# Patient Record
Sex: MALE | Race: WHITE | NOT HISPANIC OR LATINO | URBAN - METROPOLITAN AREA
[De-identification: names, ages, dates, MRNs, and addresses within clinical notes are randomized per-mention and may not be internally consistent; named-entity substitution may affect disease eponyms.]

---

## 2023-02-14 PROBLEM — I25.10 CORONARY ARTERY DISEASE: Status: ACTIVE | Noted: 2023-02-14

## 2023-02-14 PROBLEM — E55.9 VITAMIN D DEFICIENCY: Status: ACTIVE | Noted: 2023-02-14

## 2023-02-14 PROBLEM — E78.5 HYPERLIPIDEMIA: Status: ACTIVE | Noted: 2023-02-14

## 2023-02-14 PROBLEM — G47.30 SLEEP APNEA: Status: ACTIVE | Noted: 2023-02-14

## 2023-02-14 PROBLEM — I22.2: Status: ACTIVE | Noted: 2023-02-14

## 2023-02-14 PROBLEM — E66.01 MORBID OBESITY WITH BMI OF 40.0-44.9, ADULT (MULTI): Status: ACTIVE | Noted: 2023-02-14

## 2023-02-14 PROBLEM — E11.9 DIABETES MELLITUS (MULTI): Status: ACTIVE | Noted: 2023-02-14

## 2023-02-14 PROBLEM — K58.9 IBS (IRRITABLE BOWEL SYNDROME): Status: ACTIVE | Noted: 2023-02-14

## 2023-02-14 PROBLEM — I10 BENIGN ESSENTIAL HYPERTENSION: Status: ACTIVE | Noted: 2023-02-14

## 2023-02-14 PROBLEM — H90.3 SENSORINEURAL HEARING LOSS (SNHL) OF BOTH EARS: Status: ACTIVE | Noted: 2023-02-14

## 2023-02-14 RX ORDER — ATORVASTATIN CALCIUM 80 MG/1
80 TABLET, FILM COATED ORAL NIGHTLY
COMMUNITY
End: 2024-02-20 | Stop reason: WASHOUT

## 2023-02-14 RX ORDER — CARVEDILOL 6.25 MG/1
TABLET ORAL 2 TIMES DAILY
COMMUNITY
End: 2024-02-20 | Stop reason: WASHOUT

## 2023-02-14 RX ORDER — METFORMIN HYDROCHLORIDE 500 MG/1
1 TABLET ORAL DAILY
COMMUNITY
Start: 2014-09-15 | End: 2024-02-20 | Stop reason: WASHOUT

## 2023-02-14 RX ORDER — DAPAGLIFLOZIN 10 MG/1
1 TABLET, FILM COATED ORAL DAILY
COMMUNITY
End: 2024-02-20 | Stop reason: WASHOUT

## 2023-02-14 RX ORDER — EZETIMIBE 10 MG/1
10 TABLET ORAL DAILY
COMMUNITY
End: 2024-02-20 | Stop reason: WASHOUT

## 2023-02-14 RX ORDER — OLMESARTAN MEDOXOMIL 20 MG/1
20 TABLET ORAL DAILY
COMMUNITY
End: 2024-02-20 | Stop reason: WASHOUT

## 2023-02-14 RX ORDER — ASPIRIN 81 MG/1
81 TABLET ORAL DAILY
COMMUNITY

## 2023-03-27 ENCOUNTER — APPOINTMENT (OUTPATIENT)
Dept: PRIMARY CARE | Facility: CLINIC | Age: 63
End: 2023-03-27
Payer: COMMERCIAL

## 2023-04-15 LAB
ALANINE AMINOTRANSFERASE (SGPT) (U/L) IN SER/PLAS: 23 U/L (ref 10–52)
ALBUMIN (G/DL) IN SER/PLAS: 4 G/DL (ref 3.4–5)
ALBUMIN (MG/L) IN URINE: <7 MG/L
ALBUMIN/CREATININE (UG/MG) IN URINE: NORMAL UG/MG CRT (ref 0–30)
ALKALINE PHOSPHATASE (U/L) IN SER/PLAS: 75 U/L (ref 33–136)
ANION GAP IN SER/PLAS: 13 MMOL/L (ref 10–20)
ASPARTATE AMINOTRANSFERASE (SGOT) (U/L) IN SER/PLAS: 17 U/L (ref 9–39)
BILIRUBIN TOTAL (MG/DL) IN SER/PLAS: 1.4 MG/DL (ref 0–1.2)
CALCIUM (MG/DL) IN SER/PLAS: 9.4 MG/DL (ref 8.6–10.6)
CARBON DIOXIDE, TOTAL (MMOL/L) IN SER/PLAS: 25 MMOL/L (ref 21–32)
CHLORIDE (MMOL/L) IN SER/PLAS: 105 MMOL/L (ref 98–107)
CHOLESTEROL (MG/DL) IN SER/PLAS: 101 MG/DL (ref 0–199)
CHOLESTEROL IN HDL (MG/DL) IN SER/PLAS: 40.1 MG/DL
CHOLESTEROL/HDL RATIO: 2.5
CREATININE (MG/DL) IN SER/PLAS: 1.07 MG/DL (ref 0.5–1.3)
CREATININE (MG/DL) IN URINE: 122 MG/DL (ref 20–370)
ESTIMATED AVERAGE GLUCOSE FOR HBA1C: 123 MG/DL
GFR MALE: 79 ML/MIN/1.73M2
GLUCOSE (MG/DL) IN SER/PLAS: 114 MG/DL (ref 74–99)
HEMOGLOBIN A1C/HEMOGLOBIN TOTAL IN BLOOD: 5.9 %
LDL: 42 MG/DL (ref 0–99)
POTASSIUM (MMOL/L) IN SER/PLAS: 4.3 MMOL/L (ref 3.5–5.3)
PROTEIN TOTAL: 6.6 G/DL (ref 6.4–8.2)
SODIUM (MMOL/L) IN SER/PLAS: 139 MMOL/L (ref 136–145)
THYROTROPIN (MIU/L) IN SER/PLAS BY DETECTION LIMIT <= 0.05 MIU/L: 4.77 MIU/L (ref 0.44–3.98)
TRIGLYCERIDE (MG/DL) IN SER/PLAS: 93 MG/DL (ref 0–149)
UREA NITROGEN (MG/DL) IN SER/PLAS: 26 MG/DL (ref 6–23)
VLDL: 19 MG/DL (ref 0–40)

## 2023-04-17 ENCOUNTER — PATIENT MESSAGE (OUTPATIENT)
Dept: PRIMARY CARE | Facility: CLINIC | Age: 63
End: 2023-04-17

## 2023-04-17 ENCOUNTER — OFFICE VISIT (OUTPATIENT)
Dept: PRIMARY CARE | Facility: CLINIC | Age: 63
End: 2023-04-17
Payer: COMMERCIAL

## 2023-04-17 ENCOUNTER — TELEPHONE (OUTPATIENT)
Dept: PRIMARY CARE | Facility: CLINIC | Age: 63
End: 2023-04-17

## 2023-04-17 VITALS
BODY MASS INDEX: 43.69 KG/M2 | HEIGHT: 69 IN | WEIGHT: 295 LBS | DIASTOLIC BLOOD PRESSURE: 84 MMHG | TEMPERATURE: 97.4 F | SYSTOLIC BLOOD PRESSURE: 143 MMHG | HEART RATE: 89 BPM | OXYGEN SATURATION: 97 %

## 2023-04-17 DIAGNOSIS — E03.9 ACQUIRED HYPOTHYROIDISM: Primary | ICD-10-CM

## 2023-04-17 DIAGNOSIS — E11.9 TYPE 2 DIABETES MELLITUS WITHOUT COMPLICATION, WITHOUT LONG-TERM CURRENT USE OF INSULIN (MULTI): ICD-10-CM

## 2023-04-17 DIAGNOSIS — E03.9 ACQUIRED HYPOTHYROIDISM: ICD-10-CM

## 2023-04-17 PROCEDURE — 3044F HG A1C LEVEL LT 7.0%: CPT | Performed by: INTERNAL MEDICINE

## 2023-04-17 PROCEDURE — 99213 OFFICE O/P EST LOW 20 MIN: CPT | Performed by: INTERNAL MEDICINE

## 2023-04-17 PROCEDURE — 3008F BODY MASS INDEX DOCD: CPT | Performed by: INTERNAL MEDICINE

## 2023-04-17 PROCEDURE — 1036F TOBACCO NON-USER: CPT | Performed by: INTERNAL MEDICINE

## 2023-04-17 PROCEDURE — 3079F DIAST BP 80-89 MM HG: CPT | Performed by: INTERNAL MEDICINE

## 2023-04-17 PROCEDURE — 3077F SYST BP >= 140 MM HG: CPT | Performed by: INTERNAL MEDICINE

## 2023-04-17 PROCEDURE — 4010F ACE/ARB THERAPY RXD/TAKEN: CPT | Performed by: INTERNAL MEDICINE

## 2023-04-17 NOTE — ASSESSMENT & PLAN NOTE
Diabetes is chronic disease, it does not get cured but it can be controlled, in modern medicine there are variety of measures taken to control DM. Usually we want to preserve beta cell functions. Please understand the disease and how our life style can affect control of glycemia. Diabetes leads to macro and microvascular complications and they could be devastating. It is important to have annual eye check and frequent foot inspection and foot inspection. Kidney dysfunction including dialysis, foot amputations, neuropathy, foot ulcers and accelerated atherosclerotic vascular disease are known complications of uncontrolled DM, pt was educated and explained.

## 2023-04-17 NOTE — TELEPHONE ENCOUNTER
----- Message from Lane Bustillos MD sent at 4/17/2023 11:06 AM EDT -----  Continue Lipitor plus Zetia add on fish oil 1 capsule a day

## 2023-04-17 NOTE — PROGRESS NOTES
Patient ID: Villa Bowser is a 63 y.o. male who presents for Establish Care (Follow up/Go over blood work).    Assessment/Plan     Problem List Items Addressed This Visit          Endocrine/Metabolic    Diabetes mellitus (CMS/HCC)     Diabetes is chronic disease, it does not get cured but it can be controlled, in modern medicine there are variety of measures taken to control DM. Usually we want to preserve beta cell functions. Please understand the disease and how our life style can affect control of glycemia. Diabetes leads to macro and microvascular complications and they could be devastating. It is important to have annual eye check and frequent foot inspection and foot inspection. Kidney dysfunction including dialysis, foot amputations, neuropathy, foot ulcers and accelerated atherosclerotic vascular disease are known complications of uncontrolled DM, pt was educated and explained.          Acquired hypothyroidism - Primary     Check T3-T4 TSH twice a year         Relevant Orders    TSH with reflex to Free T4 if abnormal       Source of history: Nurse, Medical personnel, Medical record, Patient.  History limitation: None.      HPI  63-year-old patient have a heart disease obesity hypertension hyperlipidemia diabetes proteinuria of developing hypothyroidism advised check T3-T4 TSH go Synthroid 25 mcg/day advised to watch for heart disease EKG and palpitation arrhythmias  No Known Allergies    Medications    Current Outpatient Medications   Medication Sig Dispense Refill    aspirin 81 mg EC tablet Take 1 tablet (81 mg) by mouth once daily.      atorvastatin (Lipitor) 80 mg tablet Take 1 tablet (80 mg) by mouth once daily at bedtime.      carvedilol (Coreg) 6.25 mg tablet Take by mouth in the morning and at bedtime.      dapagliflozin (Farxiga) 10 mg Take 1 tablet (10 mg) by mouth once daily.      ezetimibe (Zetia) 10 mg tablet Take 1 tablet (10 mg) by mouth once daily.      metFORMIN (Glucophage) 500 mg  "tablet Take 1 tablet (500 mg) by mouth once daily.      olmesartan (BENIcar) 20 mg tablet Take 1 tablet (20 mg) by mouth once daily.      ticagrelor (Brilinta) 90 mg tablet Take 1 tablet (90 mg) by mouth in the morning and 1 tablet (90 mg) before bedtime.       No current facility-administered medications for this visit.       Objective   Visit Vitals  /84 (BP Location: Left arm, Patient Position: Sitting, BP Cuff Size: Large adult)   Pulse 89   Temp 36.3 °C (97.4 °F)   Ht 1.753 m (5' 9\")   Wt 134 kg (295 lb)   SpO2 97%   BMI 43.56 kg/m²   Smoking Status Former   BSA 2.55 m²       PHYSICAL EXAM  General: NAD. NCAT. Aox3   HEENT: PERRLA. EOMI. MMM. Nares patent bl.  Cardiovascular: RRR. No MRG. S1/S2 wnl.   Respiratory: CTABL. No acute respiratory distress.   GI: Soft, NT abdomen. BS present x 4.   : No CVAT BL  MSK: ROM x 4. CTLS non-tender.   Extremities: No edema. Cap refill < 2 sec.   Skin: No rashes or bruises.   Neuro: Aox3. Cranial Nerves grossly intact. Motor/sensory wnl.   Psych: Mood wnl.      Physical Exam  Cardiovascular:      Pulses:           Dorsalis pedis pulses are 2+ on the right side and 2+ on the left side.        Posterior tibial pulses are 2+ on the right side and 2+ on the left side.   Musculoskeletal:      Right foot: No deformity.      Left foot: No deformity.   Feet:      Right foot:      Protective Sensation: 5 sites tested.        Skin integrity: Skin integrity normal.      Toenail Condition: Right toenails are normal.      Left foot:      Protective Sensation: 5 sites tested.        Skin integrity: Skin integrity normal.      Toenail Condition: Left toenails are normal.         ROS  Constitutional: Denies fevers, chills, fatigue, weight loss/gain  HEENT: Denies HA, vision changes, hearing loss, sore throat  Cardiac: Denies CP, palpitations, edema  Respiratory: Denies SOB, cough, pleuritic chest pain, PND, orthopnea  GI: Denies N/V/D, abd pain, constipation, black/bloody " stools  : Denies urinary changes, frequency, hematuria, urgency, retention, flank pain  MSK: Denies joint pain, joint swelling, back pain, neck pain, extremity pain  Neuro: Denies numbness, weakness, tingling    Immunization History   Administered Date(s) Administered    Influenza, injectable, quadrivalent, preservative free 10/24/2021    Influenza, seasonal, injectable 10/20/2016, 10/18/2022    Pfizer Gray Cap SARS-CoV-2 05/17/2022    Pfizer Purple Cap SARS-CoV-2 03/17/2021, 04/07/2021, 12/21/2021, 05/17/2022    Pneumococcal Polysaccharide PPSV23 10/30/2012    Tdap 07/30/2018    Zoster, Unspecified 09/04/2020       Orders Only on 04/15/2023   Component Date Value Ref Range Status    TSH 04/15/2023 4.77 (H)  0.44 - 3.98 mIU/L Final   Orders Only on 04/15/2023   Component Date Value Ref Range Status    Hemoglobin A1C 04/15/2023 5.9 (A)  % Final    Estimated Average Glucose 04/15/2023 123  MG/DL Final   Orders Only on 04/15/2023   Component Date Value Ref Range Status    Cholesterol 04/15/2023 101  0 - 199 mg/dL Final    HDL 04/15/2023 40.1  mg/dL Final    Cholesterol/HDL Ratio 04/15/2023 2.5   Final    LDL 04/15/2023 42  0 - 99 mg/dL Final    VLDL 04/15/2023 19  0 - 40 mg/dL Final    Triglycerides 04/15/2023 93  0 - 149 mg/dL Final   Orders Only on 04/15/2023   Component Date Value Ref Range Status    ALBUMIN (MG/L) IN URINE 04/15/2023 <7.0  Not Established mg/L Final    Albumin/Creatine Ratio 04/15/2023 SEE COMMENT  0.0 - 30.0 ug/mg crt Final    Creatinine, Urine 04/15/2023 122.0  20.0 - 370.0 mg/dL Final   Orders Only on 04/15/2023   Component Date Value Ref Range Status    Glucose 04/15/2023 114 (H)  74 - 99 mg/dL Final    Sodium 04/15/2023 139  136 - 145 mmol/L Final    Potassium 04/15/2023 4.3  3.5 - 5.3 mmol/L Final    Chloride 04/15/2023 105  98 - 107 mmol/L Final    Bicarbonate 04/15/2023 25  21 - 32 mmol/L Final    Anion Gap 04/15/2023 13  10 - 20 mmol/L Final    Urea Nitrogen 04/15/2023 26 (H)  6 - 23  mg/dL Final    Creatinine 04/15/2023 1.07  0.50 - 1.30 mg/dL Final    GFR MALE 04/15/2023 79  >90 mL/min/1.73m2 Final    Calcium 04/15/2023 9.4  8.6 - 10.6 mg/dL Final    Albumin 04/15/2023 4.0  3.4 - 5.0 g/dL Final    Alkaline Phosphatase 04/15/2023 75  33 - 136 U/L Final    Total Protein 04/15/2023 6.6  6.4 - 8.2 g/dL Final    AST 04/15/2023 17  9 - 39 U/L Final    Total Bilirubin 04/15/2023 1.4 (H)  0.0 - 1.2 mg/dL Final    ALT (SGPT) 04/15/2023 23  10 - 52 U/L Final       Radiology: Reviewed imaging in powerchart.  No results found.    Family History   Problem Relation Name Age of Onset    Cancer Mother      Diabetes Mother      Hypertension Mother      Other (heart problem) Mother      Hearing loss Father      Hyperlipidemia Father      Other (heart problem) Father      Clotting disorder Sister          bleeding disorder    Cancer Sister      Diabetes Sister      Other (heart problem) Sister      Diabetes Brother       Social History     Socioeconomic History    Marital status:      Spouse name: None    Number of children: None    Years of education: None    Highest education level: None   Occupational History    None   Tobacco Use    Smoking status: Former     Packs/day: 1.00     Years: 15.00     Pack years: 15.00     Types: Cigarettes    Smokeless tobacco: Never   Vaping Use    Vaping status: None   Substance and Sexual Activity    Alcohol use: Never    Drug use: Never    Sexual activity: None   Other Topics Concern    None   Social History Narrative    None     Social Determinants of Health     Financial Resource Strain: Not on file   Food Insecurity: Not on file   Transportation Needs: Not on file   Physical Activity: Not on file   Stress: Not on file   Social Connections: Not on file   Intimate Partner Violence: Not on file   Housing Stability: Not on file     Past Medical History:   Diagnosis Date    Acute upper respiratory infection, unspecified 01/23/2020    Acute URI    Encounter for screening  for malignant neoplasm of colon 08/01/2019    Screen for colon cancer    Encounter for screening for malignant neoplasm of rectum 08/01/2019    Screening for rectal cancer    Other specified disorders of eustachian tube, bilateral 08/10/2020    Dysfunction of both eustachian tubes    Pain in right knee 11/08/2021    Chronic pain of both knees    Pain in unspecified knee 05/18/2017    Knee pain    Personal history of other diseases of the circulatory system     History of hypertension    Personal history of other diseases of the digestive system 07/20/2017    History of irritable bowel syndrome    Personal history of other diseases of the respiratory system 02/05/2018    History of acute bronchitis    Personal history of other diseases of the respiratory system 02/05/2018    History of reactive airway disease    Personal history of other endocrine, nutritional and metabolic disease 09/30/2021    History of morbid obesity    Personal history of other endocrine, nutritional and metabolic disease     History of high cholesterol    Sensorineural hearing loss, bilateral 04/15/2019    Sensorineural hearing loss (SNHL) of both ears    Type 2 diabetes mellitus without complications (CMS/HCC) 12/02/2022    Diabetes mellitus     Past Surgical History:   Procedure Laterality Date    GALLBLADDER SURGERY  09/15/2014    Gallbladder Surgery    OTHER SURGICAL HISTORY  04/15/2019    Kidney surgery     * Cannot find OR log *    Charting was completed using voice recognition technology and may include unintended errors.

## 2023-04-17 NOTE — TELEPHONE ENCOUNTER
----- Message from Lane Bustillos MD sent at 4/17/2023 11:02 AM EDT -----  Hypothyroidism advised Synthroid 25 mcg twice a week repeat TSH 6 weeks

## 2023-04-18 RX ORDER — LEVOTHYROXINE SODIUM 25 UG/1
25 TABLET ORAL DAILY
Qty: 90 TABLET | Refills: 3 | Status: SHIPPED | OUTPATIENT
Start: 2023-04-18 | End: 2023-04-20 | Stop reason: SDUPTHER

## 2023-04-20 DIAGNOSIS — E03.9 ACQUIRED HYPOTHYROIDISM: ICD-10-CM

## 2023-04-20 RX ORDER — LEVOTHYROXINE SODIUM 25 UG/1
25 TABLET ORAL DAILY
Qty: 90 TABLET | Refills: 3 | Status: SHIPPED | OUTPATIENT
Start: 2023-04-20 | End: 2023-04-20

## 2023-07-14 ENCOUNTER — LAB (OUTPATIENT)
Dept: LAB | Facility: LAB | Age: 63
End: 2023-07-14
Payer: COMMERCIAL

## 2023-07-14 DIAGNOSIS — E03.9 ACQUIRED HYPOTHYROIDISM: ICD-10-CM

## 2023-07-14 LAB — THYROTROPIN (MIU/L) IN SER/PLAS BY DETECTION LIMIT <= 0.05 MIU/L: 2.87 MIU/L (ref 0.44–3.98)

## 2023-07-14 PROCEDURE — 36415 COLL VENOUS BLD VENIPUNCTURE: CPT

## 2023-07-14 PROCEDURE — 84443 ASSAY THYROID STIM HORMONE: CPT

## 2023-07-17 ENCOUNTER — OFFICE VISIT (OUTPATIENT)
Dept: PRIMARY CARE | Facility: CLINIC | Age: 63
End: 2023-07-17
Payer: COMMERCIAL

## 2023-07-17 VITALS
HEIGHT: 69 IN | SYSTOLIC BLOOD PRESSURE: 133 MMHG | BODY MASS INDEX: 43.69 KG/M2 | TEMPERATURE: 97.5 F | DIASTOLIC BLOOD PRESSURE: 77 MMHG | HEART RATE: 85 BPM | OXYGEN SATURATION: 98 % | WEIGHT: 295 LBS

## 2023-07-17 DIAGNOSIS — I10 BENIGN ESSENTIAL HYPERTENSION: ICD-10-CM

## 2023-07-17 DIAGNOSIS — I22.2: ICD-10-CM

## 2023-07-17 DIAGNOSIS — E11.9 TYPE 2 DIABETES MELLITUS WITHOUT COMPLICATION, WITHOUT LONG-TERM CURRENT USE OF INSULIN (MULTI): ICD-10-CM

## 2023-07-17 DIAGNOSIS — E03.9 ACQUIRED HYPOTHYROIDISM: Primary | ICD-10-CM

## 2023-07-17 DIAGNOSIS — E66.01 MORBID OBESITY WITH BMI OF 40.0-44.9, ADULT (MULTI): ICD-10-CM

## 2023-07-17 PROCEDURE — 3008F BODY MASS INDEX DOCD: CPT | Performed by: INTERNAL MEDICINE

## 2023-07-17 PROCEDURE — 3078F DIAST BP <80 MM HG: CPT | Performed by: INTERNAL MEDICINE

## 2023-07-17 PROCEDURE — 4010F ACE/ARB THERAPY RXD/TAKEN: CPT | Performed by: INTERNAL MEDICINE

## 2023-07-17 PROCEDURE — 99214 OFFICE O/P EST MOD 30 MIN: CPT | Performed by: INTERNAL MEDICINE

## 2023-07-17 PROCEDURE — 3044F HG A1C LEVEL LT 7.0%: CPT | Performed by: INTERNAL MEDICINE

## 2023-07-17 PROCEDURE — 1036F TOBACCO NON-USER: CPT | Performed by: INTERNAL MEDICINE

## 2023-07-17 PROCEDURE — 3075F SYST BP GE 130 - 139MM HG: CPT | Performed by: INTERNAL MEDICINE

## 2023-07-17 NOTE — ASSESSMENT & PLAN NOTE
Discussed with the patient's and cardiology continue anticoagulation check lipid hemoglobin A1c thyroid EKG

## 2023-07-17 NOTE — PROGRESS NOTES
Subjective   Patient ID: Villa Bowser is a 63 y.o. male who presents for Follow-up (On Thyroid ).    Assessment/Plan     Problem List Items Addressed This Visit          Cardiac and Vasculature    Benign essential hypertension     Patients BP readings reviewed and addressed, as we age our arteries turn stiffer and less elastic. Restricting salt consumption and staying physically fit with regular exercise regimen is the only way to keep our vasculature less tonic. Studies have shown that keeping ideal body wt, exercise routine about 140 to 150 minutes a week, eating variety of plant based diet and drinking plentiful water are quite helpful. Monitor BP twice or once a week at home and bring log to be reviewed by me. Uncontrolled BP has long term consequences including heart failure, myocardial infarction, accelerated atherosclerosis and kidney dysfunction. Therapy reviewed and explained.           Acute non-ST segment elevation myocardial infarction (NSTEMI) following previous myocardial infarction (CMS/HCC)       Discussed with the patient's and cardiology continue anticoagulation check lipid hemoglobin A1c thyroid EKG              Endocrine/Metabolic    Diabetes mellitus (CMS/HCC)     Diabetes is chronic disease, it does not get cured but it can be controlled, in modern medicine there are variety of measures taken to control DM. Usually we want to preserve beta cell functions. Please understand the disease and how our life style can affect control of glycemia. Diabetes leads to macro and microvascular complications and they could be devastating. It is important to have annual eye check and frequent foot inspection and foot inspection. Kidney dysfunction including dialysis, foot amputations, neuropathy, foot ulcers and accelerated atherosclerotic vascular disease are known complications of uncontrolled DM, pt was educated and explained.           Relevant Orders    Albumin , Urine Random    Hemoglobin A1C     TSH with reflex to Free T4 if abnormal    Morbid obesity with BMI of 40.0-44.9, adult (CMS/[facility])     I spent <15 minutes face to face with individual providing recommendations for nutrition choices and exercise plan to help achieve weight reduction goals. Obesity is systemic disorder and it can bring devastating morbidities in furture. It is a matter of calorie gain and loss, keeping bodybank in negative calorie balance mode is the way to sustain weight loss.Diet has a big role in reducing excess body wt. Scheduled and well planned meals and food intake with watchfulness and understanding of calorie portion and distribution is key to understand. Bariatric surgery is another option if sustained wt loss is not achieved and faced with one or more comorbidities with morbid obesity. Weigh yourself twice a week to understand and follow wt loss goals.           Acquired hypothyroidism - Primary    Relevant Orders    Albumin , Urine Random    Hemoglobin A1C    TSH with reflex to Free T4 if abnormal       HPI 63-year-old patient have a metabolic syndrome diabetes hypertension hyperlipidemia atherosclerotic heart disease obesity complaining arthralgia myalgia fatigue tired weakness    Negative for hypoxia hypoglycemia    Negative for headache chest pain or fall    Negative for sleep apnea snoring at night.    No Known Allergies    Current Outpatient Medications   Medication Sig Dispense Refill    aspirin 81 mg EC tablet Take 1 tablet (81 mg) by mouth once daily.      atorvastatin (Lipitor) 80 mg tablet Take 1 tablet (80 mg) by mouth once daily at bedtime.      carvedilol (Coreg) 6.25 mg tablet Take by mouth in the morning and at bedtime.      dapagliflozin (Farxiga) 10 mg Take 1 tablet (10 mg) by mouth once daily.      ezetimibe (Zetia) 10 mg tablet Take 1 tablet (10 mg) by mouth once daily.      levothyroxine (Synthroid, Levoxyl) 25 mcg tablet Take 1 tablet (25 mcg) by mouth once daily. 90 tablet 3    metFORMIN (Glucophage)  "500 mg tablet Take 1 tablet (500 mg) by mouth once daily.      olmesartan (BENIcar) 20 mg tablet Take 1 tablet (20 mg) by mouth once daily.      ticagrelor (Brilinta) 90 mg tablet Take 1 tablet (90 mg) by mouth 2 times a day.       No current facility-administered medications for this visit.       Objective   Visit Vitals  /77 (BP Location: Left arm, Patient Position: Sitting, BP Cuff Size: Large adult)   Pulse 85   Temp 36.4 °C (97.5 °F)   Ht 1.753 m (5' 9\")   Wt 134 kg (295 lb)   SpO2 98%   BMI 43.56 kg/m²   Smoking Status Former   BSA 2.55 m²     Physical Exam  Constitutional:       General: He is not in acute distress.     Appearance: Normal appearance.  Obesity  HENT:      Head: Normocephalic and atraumatic.      Nose: Nose normal.   Eyes:      Extraocular Movements: Extraocular movements intact.      Conjunctiva/sclera: Conjunctivae normal.   Cardiovascular:      Rate and Rhythm: Normal rate and regular rhythm.  Systolic heart murmur discussed with patient and cardiology continue anticoagulation discussed with the patient's and cardiology continue anticoagulation  Pulmonary:      Effort: Pulmonary effort is normal.      Breath sounds: Normal breath sounds.   Skin:     General: Skin is warm.   Neurological:      Mental Status: He is alert and oriented to person, place, and time.   Psychiatric:         Mood and Affect: Mood normal.         Behavior: Behavior normal.   Immunization History   Administered Date(s) Administered    Influenza, Unspecified 09/15/2009, 10/20/2016, 09/24/2019, 09/04/2020, 10/18/2022    Influenza, injectable, quadrivalent, preservative free 10/24/2021    Influenza, seasonal, injectable 10/30/2012, 10/20/2016, 10/18/2022    Pfizer Gray Cap SARS-CoV-2 05/17/2022    Pfizer Purple Cap SARS-CoV-2 03/17/2021, 04/07/2021, 12/21/2021, 05/17/2022    Pneumococcal Polysaccharide PPSV23 10/30/2012    Tdap 07/30/2018    Zoster, Recombinant 09/04/2020    Zoster, Unspecified 09/04/2020 "       Review of Systems    Lab on 07/14/2023   Component Date Value Ref Range Status    TSH 07/14/2023 2.87  0.44 - 3.98 mIU/L Final   Orders Only on 04/15/2023   Component Date Value Ref Range Status    TSH 04/15/2023 4.77 (H)  0.44 - 3.98 mIU/L Final   Orders Only on 04/15/2023   Component Date Value Ref Range Status    Hemoglobin A1C 04/15/2023 5.9 (A)  % Final    Estimated Average Glucose 04/15/2023 123  MG/DL Final   Orders Only on 04/15/2023   Component Date Value Ref Range Status    Cholesterol 04/15/2023 101  0 - 199 mg/dL Final    HDL 04/15/2023 40.1  mg/dL Final    Cholesterol/HDL Ratio 04/15/2023 2.5   Final    LDL 04/15/2023 42  0 - 99 mg/dL Final    VLDL 04/15/2023 19  0 - 40 mg/dL Final    Triglycerides 04/15/2023 93  0 - 149 mg/dL Final   Orders Only on 04/15/2023   Component Date Value Ref Range Status    ALBUMIN (MG/L) IN URINE 04/15/2023 <7.0  Not Established mg/L Final    Albumin/Creatine Ratio 04/15/2023 SEE COMMENT  0.0 - 30.0 ug/mg crt Final    Creatinine, Urine 04/15/2023 122.0  20.0 - 370.0 mg/dL Final   Orders Only on 04/15/2023   Component Date Value Ref Range Status    Glucose 04/15/2023 114 (H)  74 - 99 mg/dL Final    Sodium 04/15/2023 139  136 - 145 mmol/L Final    Potassium 04/15/2023 4.3  3.5 - 5.3 mmol/L Final    Chloride 04/15/2023 105  98 - 107 mmol/L Final    Bicarbonate 04/15/2023 25  21 - 32 mmol/L Final    Anion Gap 04/15/2023 13  10 - 20 mmol/L Final    Urea Nitrogen 04/15/2023 26 (H)  6 - 23 mg/dL Final    Creatinine 04/15/2023 1.07  0.50 - 1.30 mg/dL Final    GFR MALE 04/15/2023 79  >90 mL/min/1.73m2 Final    Calcium 04/15/2023 9.4  8.6 - 10.6 mg/dL Final    Albumin 04/15/2023 4.0  3.4 - 5.0 g/dL Final    Alkaline Phosphatase 04/15/2023 75  33 - 136 U/L Final    Total Protein 04/15/2023 6.6  6.4 - 8.2 g/dL Final    AST 04/15/2023 17  9 - 39 U/L Final    Total Bilirubin 04/15/2023 1.4 (H)  0.0 - 1.2 mg/dL Final    ALT (SGPT) 04/15/2023 23  10 - 52 U/L Final       Radiology:  Reviewed imaging in powerchart.  No results found.    Family History   Problem Relation Name Age of Onset    Cancer Mother      Diabetes Mother      Hypertension Mother      Other (heart problem) Mother      Hearing loss Father      Hyperlipidemia Father      Other (heart problem) Father      Clotting disorder Sister          bleeding disorder    Cancer Sister      Diabetes Sister      Other (heart problem) Sister      Diabetes Brother       Social History     Socioeconomic History    Marital status:      Spouse name: None    Number of children: None    Years of education: None    Highest education level: None   Occupational History    None   Tobacco Use    Smoking status: Former     Packs/day: 1.00     Years: 15.00     Total pack years: 15.00     Types: Cigarettes    Smokeless tobacco: Never   Substance and Sexual Activity    Alcohol use: Never    Drug use: Never    Sexual activity: None   Other Topics Concern    None   Social History Narrative    None     Social Determinants of Health     Financial Resource Strain: Not on file   Food Insecurity: Not on file   Transportation Needs: Not on file   Physical Activity: Not on file   Stress: Not on file   Social Connections: Not on file   Intimate Partner Violence: Not on file   Housing Stability: Not on file     Past Medical History:   Diagnosis Date    Acute upper respiratory infection, unspecified 01/23/2020    Acute URI    Encounter for screening for malignant neoplasm of colon 08/01/2019    Screen for colon cancer    Encounter for screening for malignant neoplasm of rectum 08/01/2019    Screening for rectal cancer    Other specified disorders of eustachian tube, bilateral 08/10/2020    Dysfunction of both eustachian tubes    Pain in right knee 11/08/2021    Chronic pain of both knees    Pain in unspecified knee 05/18/2017    Knee pain    Personal history of other diseases of the circulatory system     History of hypertension    Personal history of other  diseases of the digestive system 07/20/2017    History of irritable bowel syndrome    Personal history of other diseases of the respiratory system 02/05/2018    History of acute bronchitis    Personal history of other diseases of the respiratory system 02/05/2018    History of reactive airway disease    Personal history of other endocrine, nutritional and metabolic disease 09/30/2021    History of morbid obesity    Personal history of other endocrine, nutritional and metabolic disease     History of high cholesterol    Sensorineural hearing loss, bilateral 04/15/2019    Sensorineural hearing loss (SNHL) of both ears    Type 2 diabetes mellitus without complications (CMS/HCC) 12/02/2022    Diabetes mellitus     Past Surgical History:   Procedure Laterality Date    GALLBLADDER SURGERY  09/15/2014    Gallbladder Surgery    OTHER SURGICAL HISTORY  04/15/2019    Kidney surgery       Charting was completed using voice recognition technology and may include unintended errors.

## 2023-10-29 DIAGNOSIS — E78.5 HYPERLIPIDEMIA, UNSPECIFIED HYPERLIPIDEMIA TYPE: ICD-10-CM

## 2023-10-29 DIAGNOSIS — I10 BENIGN ESSENTIAL HYPERTENSION: ICD-10-CM

## 2023-10-29 DIAGNOSIS — E11.9 TYPE 2 DIABETES MELLITUS WITHOUT COMPLICATION, WITHOUT LONG-TERM CURRENT USE OF INSULIN (MULTI): ICD-10-CM

## 2023-10-30 ENCOUNTER — PHARMACY VISIT (OUTPATIENT)
Dept: PHARMACY | Facility: CLINIC | Age: 63
End: 2023-10-30
Payer: COMMERCIAL

## 2023-10-30 PROCEDURE — RXMED WILLOW AMBULATORY MEDICATION CHARGE

## 2023-10-30 RX ORDER — DAPAGLIFLOZIN 10 MG/1
10 TABLET, FILM COATED ORAL DAILY
Qty: 90 TABLET | Refills: 3 | Status: SHIPPED | OUTPATIENT
Start: 2023-10-30 | End: 2024-10-28

## 2023-10-30 RX ORDER — METFORMIN HYDROCHLORIDE 500 MG/1
500 TABLET ORAL DAILY
Qty: 90 TABLET | Refills: 3 | Status: SHIPPED | OUTPATIENT
Start: 2023-10-30 | End: 2024-10-28

## 2023-10-30 RX ORDER — CARVEDILOL 6.25 MG/1
6.25 TABLET ORAL 2 TIMES DAILY
Qty: 180 TABLET | Refills: 3 | Status: SHIPPED | OUTPATIENT
Start: 2023-10-30 | End: 2024-10-28

## 2023-10-30 RX ORDER — OLMESARTAN MEDOXOMIL AND HYDROCHLOROTHIAZIDE 20/12.5 20; 12.5 MG/1; MG/1
1 TABLET ORAL
Qty: 90 TABLET | Refills: 3 | Status: SHIPPED | OUTPATIENT
Start: 2023-10-30 | End: 2024-10-28

## 2023-10-30 RX ORDER — EZETIMIBE 10 MG/1
10 TABLET ORAL NIGHTLY
Qty: 90 TABLET | Refills: 3 | Status: SHIPPED | OUTPATIENT
Start: 2023-10-30 | End: 2024-10-28

## 2023-10-30 RX ORDER — ATORVASTATIN CALCIUM 80 MG/1
80 TABLET, FILM COATED ORAL NIGHTLY
Qty: 90 TABLET | Refills: 3 | Status: SHIPPED | OUTPATIENT
Start: 2023-10-30 | End: 2024-10-28

## 2023-11-02 ENCOUNTER — PHARMACY VISIT (OUTPATIENT)
Dept: PHARMACY | Facility: CLINIC | Age: 63
End: 2023-11-02
Payer: COMMERCIAL

## 2023-11-02 PROCEDURE — RXMED WILLOW AMBULATORY MEDICATION CHARGE

## 2024-01-22 PROCEDURE — RXMED WILLOW AMBULATORY MEDICATION CHARGE

## 2024-01-23 ENCOUNTER — PHARMACY VISIT (OUTPATIENT)
Dept: PHARMACY | Facility: CLINIC | Age: 64
End: 2024-01-23
Payer: COMMERCIAL

## 2024-02-15 PROBLEM — I10 HYPERTENSION: Status: ACTIVE | Noted: 2024-02-15

## 2024-02-15 PROBLEM — E66.01 MORBID OBESITY (MULTI): Status: ACTIVE | Noted: 2024-02-15

## 2024-02-15 PROBLEM — G80.9 CEREBRAL PALSY (MULTI): Status: ACTIVE | Noted: 2024-02-15

## 2024-02-15 PROBLEM — E66.01 MORBID OBESITY (MULTI): Status: RESOLVED | Noted: 2024-02-15 | Resolved: 2024-02-15

## 2024-02-15 PROBLEM — R07.9 CHEST PAIN: Status: ACTIVE | Noted: 2022-11-30

## 2024-02-15 PROBLEM — G89.29 CHRONIC PAIN OF BOTH KNEES: Status: ACTIVE | Noted: 2024-02-15

## 2024-02-15 PROBLEM — M25.562 CHRONIC PAIN OF BOTH KNEES: Status: ACTIVE | Noted: 2024-02-15

## 2024-02-15 PROBLEM — J20.9 ACUTE BRONCHITIS: Status: ACTIVE | Noted: 2024-02-15

## 2024-02-15 PROBLEM — Z95.5 STATUS POST INSERTION OF DRUG-ELUTING STENT INTO RIGHT CORONARY ARTERY FOR CORONARY ARTERY DISEASE: Status: ACTIVE | Noted: 2024-02-15

## 2024-02-15 PROBLEM — M25.561 CHRONIC PAIN OF BOTH KNEES: Status: ACTIVE | Noted: 2024-02-15

## 2024-02-15 PROBLEM — K63.5 COLON POLYP: Status: ACTIVE | Noted: 2024-02-15

## 2024-02-19 NOTE — PROGRESS NOTES
[facility] ([facility])    Villa Bowser is a 64 y.o. male was referred to Clinical Pharmacy Team to complete a comprehensive medication review (CMR) with a pharmacist as part of the Value Based [employer] diabetes program.      Referring Provider: Lane Bustillos MD    Subjective   No Known Allergies    [facility]  [address] [place] [address], [place] [id number]  Phone: [phone] Fax: [phone]      HPI    Objective     There were no vitals taken for this visit.     LAB  Lab Results   Component Value Date    BILITOT 1.4 (H) 04/15/2023    CALCIUM 9.4 04/15/2023    CO2 25 04/15/2023     04/15/2023    CREATININE 1.07 04/15/2023    GLUCOSE 114 (H) 04/15/2023    ALKPHOS 75 04/15/2023    K 4.3 04/15/2023    PROT 6.6 04/15/2023     04/15/2023    AST 17 04/15/2023    ALT 23 04/15/2023    BUN 26 (H) 04/15/2023    ANIONGAP 13 04/15/2023    MG 1.94 12/01/2022    PHOS 4.0 12/01/2022    ALBUMIN 4.0 04/15/2023    GFRMALE 79 04/15/2023     Lab Results   Component Value Date    TRIG 93 04/15/2023    CHOL 101 04/15/2023    HDL 40.1 04/15/2023     Lab Results   Component Value Date    HGBA1C 5.9 (A) 04/15/2023       Current Outpatient Medications on File Prior to Visit   Medication Sig Dispense Refill    aspirin 81 mg EC tablet Take 1 tablet (81 mg) by mouth once daily.      atorvastatin (Lipitor) 80 mg tablet TAKE 1 TABLET BY MOUTH AT BEDTIME 90 tablet 3    carvedilol (Coreg) 6.25 mg tablet TAKE 1 TABLET BY MOUTH TWO TIMES A  tablet 3    dapagliflozin propanediol (Farxiga) 10 mg TAKE 1 TABLET BY MOUTH ONCE DAILY 90 tablet 3    ezetimibe (Zetia) 10 mg tablet TAKE 1 TABLET BY MOUTH IN THE EVENING 90 tablet 3    levothyroxine (Synthroid, Levoxyl) 25 mcg tablet TAKE 1 TABLET BY MOUTH ONCE DAILY 90 tablet 3    olmesartan-hydrochlorothiazide (BENIcar HCT) 20-12.5 mg tablet TAKE 1 TABLET BY MOUTH EVERY MORNING 90 tablet 3    metFORMIN (Glucophage) 500  "mg tablet TAKE 1 TABLET BY MOUTH ONCE DAILY 90 tablet 3    [DISCONTINUED] atorvastatin (Lipitor) 80 mg tablet Take 1 tablet (80 mg) by mouth once daily at bedtime.      [DISCONTINUED] carvedilol (Coreg) 6.25 mg tablet Take by mouth in the morning and at bedtime.      [DISCONTINUED] dapagliflozin (Farxiga) 10 mg Take 1 tablet (10 mg) by mouth once daily.      [DISCONTINUED] ezetimibe (Zetia) 10 mg tablet Take 1 tablet (10 mg) by mouth once daily.      [DISCONTINUED] metFORMIN (Glucophage) 500 mg tablet Take 1 tablet (500 mg) by mouth once daily.      [DISCONTINUED] olmesartan (BENIcar) 20 mg tablet Take 1 tablet (20 mg) by mouth once daily.      [DISCONTINUED] ticagrelor (Brilinta) 90 mg tablet Take 1 tablet (90 mg) by mouth 2 times a day.       No current facility-administered medications on file prior to visit.        HISTORICAL PHARMACOTHERAPY (MED+REASON FOR DC)  -\"A shot\" (can't recall)- PCP told him it was time to stop it.     DRUG INTERACTIONS  - No relevant drug:drug interactions.     SECONDARY PREVENTION  - Statin? yes  - ACE-I/ARB? yes    ASSESSMENT/PLAN:   Employee Health Diabetes Program (VBID)  - Patient enrolled in  Employee diabetes program for $0 co-pays on diabetes medications/supplies. Enrollment should be active in 2-4 weeks.  - Requested VBID enrollment date: 2024  - PharmD Management Level: 1    Assessment/Plan   Problem List Items Addressed This Visit       Diabetes mellitus (CMS/[facility]) - Primary     PATIENT EDUCATION/GOALS  Current A1c: [ 5.9% ] [ 4/15/2023 ]     Goals  Fasting B - 130 mg/dL  Postprandial BG: less than 180 mg/dL  A1c: less than 7%    Assessment/Plan  Checks blood sugar once weekly in the morning, fasting. Average 100-105.   Due for an updated A1C, patient plans to make appointment with PCP    Medication Changes:  CONTINUE:  Farxiga 10mg daily  Metformin 500mg daily                   Follow up: 11 month    Continue all meds under the continuation of care with the " referring provider and clinical pharmacy team.    Taina Driver, PharmD     Verbal consent to manage patient's drug therapy was obtained from [the patient and/or an individual authorized to act on behalf of a patient]. They were informed they may decline to participate or withdraw from participation in pharmacy services at any time.

## 2024-02-20 ENCOUNTER — OFFICE VISIT (OUTPATIENT)
Dept: CARDIOLOGY | Facility: CLINIC | Age: 64
End: 2024-02-20
Payer: COMMERCIAL

## 2024-02-20 ENCOUNTER — TELEMEDICINE (OUTPATIENT)
Dept: PHARMACY | Facility: HOSPITAL | Age: 64
End: 2024-02-20
Payer: COMMERCIAL

## 2024-02-20 VITALS — WEIGHT: 315 LBS | HEIGHT: 69 IN | BODY MASS INDEX: 46.65 KG/M2

## 2024-02-20 DIAGNOSIS — E11.9 TYPE 2 DIABETES MELLITUS WITHOUT COMPLICATION, WITHOUT LONG-TERM CURRENT USE OF INSULIN (MULTI): Primary | ICD-10-CM

## 2024-02-20 DIAGNOSIS — I10 PRIMARY HYPERTENSION: ICD-10-CM

## 2024-02-20 DIAGNOSIS — Z95.5 STATUS POST INSERTION OF DRUG-ELUTING STENT INTO RIGHT CORONARY ARTERY FOR CORONARY ARTERY DISEASE: ICD-10-CM

## 2024-02-20 DIAGNOSIS — I22.2: ICD-10-CM

## 2024-02-20 DIAGNOSIS — I25.10 CORONARY ARTERY DISEASE INVOLVING NATIVE CORONARY ARTERY OF NATIVE HEART WITHOUT ANGINA PECTORIS: Primary | ICD-10-CM

## 2024-02-20 DIAGNOSIS — I10 BENIGN ESSENTIAL HYPERTENSION: ICD-10-CM

## 2024-02-20 DIAGNOSIS — E78.49 OTHER HYPERLIPIDEMIA: ICD-10-CM

## 2024-02-20 LAB
ATRIAL RATE: 95 BPM
P AXIS: 49 DEGREES
P OFFSET: 182 MS
P ONSET: 145 MS
PR INTERVAL: 150 MS
Q ONSET: 220 MS
QRS COUNT: 16 BEATS
QRS DURATION: 88 MS
QT INTERVAL: 356 MS
QTC CALCULATION(BAZETT): 447 MS
QTC FREDERICIA: 414 MS
R AXIS: 55 DEGREES
T AXIS: 65 DEGREES
T OFFSET: 398 MS
VENTRICULAR RATE: 95 BPM

## 2024-02-20 PROCEDURE — 93005 ELECTROCARDIOGRAM TRACING: CPT | Performed by: STUDENT IN AN ORGANIZED HEALTH CARE EDUCATION/TRAINING PROGRAM

## 2024-02-20 PROCEDURE — 93010 ELECTROCARDIOGRAM REPORT: CPT | Performed by: STUDENT IN AN ORGANIZED HEALTH CARE EDUCATION/TRAINING PROGRAM

## 2024-02-20 PROCEDURE — 1036F TOBACCO NON-USER: CPT | Performed by: STUDENT IN AN ORGANIZED HEALTH CARE EDUCATION/TRAINING PROGRAM

## 2024-02-20 PROCEDURE — 99213 OFFICE O/P EST LOW 20 MIN: CPT | Mod: 25 | Performed by: STUDENT IN AN ORGANIZED HEALTH CARE EDUCATION/TRAINING PROGRAM

## 2024-02-20 PROCEDURE — 3008F BODY MASS INDEX DOCD: CPT | Performed by: STUDENT IN AN ORGANIZED HEALTH CARE EDUCATION/TRAINING PROGRAM

## 2024-02-20 PROCEDURE — 99213 OFFICE O/P EST LOW 20 MIN: CPT | Performed by: STUDENT IN AN ORGANIZED HEALTH CARE EDUCATION/TRAINING PROGRAM

## 2024-02-20 ASSESSMENT — ENCOUNTER SYMPTOMS
DEPRESSION: 0
LOSS OF SENSATION IN FEET: 0
OCCASIONAL FEELINGS OF UNSTEADINESS: 0

## 2024-02-20 NOTE — PROGRESS NOTES
"Chief Complaint:   No chief complaint on file.     History Of Present Illness:    Vilal Bowser is a 64 y.o. male returns for yearly appointment.  He is now off of Brilinta.  Has not had any bleeding events.  He has been trying to work on weight loss.  He lost 50 pounds while on weight watchers and then gained 20 pounds back.  He just came back from a cruise to the [facility].  Denies chest discomfort shortness of breath lightheadedness dizziness or syncopal events.  States his blood pressure is typically pretty well-controlled.  He is compliant with his current medication regiment.  EKG today is notable for normal sinus rhythm normal axis and appropriate R wave progression.     Last Recorded Vitals:  Vitals:    02/20/24 1541   Weight: 147 kg (323 lb)   Height: 1.753 m (5' 9\")       Review of Systems  ROS      Allergies:  Patient has no allergy information on record.    Outpatient Medications:  Current Outpatient Medications   Medication Instructions    aspirin 81 mg, oral, Daily    atorvastatin (Lipitor) 80 mg tablet TAKE 1 TABLET BY MOUTH AT BEDTIME    carvedilol (Coreg) 6.25 mg tablet TAKE 1 TABLET BY MOUTH TWO TIMES A DAY    dapagliflozin propanediol (Farxiga) 10 mg TAKE 1 TABLET BY MOUTH ONCE DAILY    ezetimibe (Zetia) 10 mg tablet TAKE 1 TABLET BY MOUTH IN THE EVENING    levothyroxine (Synthroid, Levoxyl) 25 mcg tablet TAKE 1 TABLET BY MOUTH ONCE DAILY    metFORMIN (Glucophage) 500 mg tablet TAKE 1 TABLET BY MOUTH ONCE DAILY    olmesartan-hydrochlorothiazide (BENIcar HCT) 20-12.5 mg tablet TAKE 1 TABLET BY MOUTH EVERY MORNING       Physical Exam:  General: No acute distress,  A&O x3, obese male  Skin: Warm and dry  Neck: JVD is not elevated  ENT: Moist mucous membranes no lesions appreciated  Pulmonary: CTAB  Cards: Regular rate rhythm, no murmurs gallops or rubs appreciated normal S1-S2  Abdomen: Obese, soft nontender nondistended  Extremities: No edema or cyanosis  Psych: Appropriate mood and affect      "   Last Labs:  CBC -  Lab Results   Component Value Date    WBC 8.5 12/01/2022    HGB 14.0 12/01/2022    HCT 42.5 12/01/2022    MCV 85 12/01/2022     12/01/2022       CMP -  Lab Results   Component Value Date    CALCIUM 9.4 04/15/2023    PHOS 4.0 12/01/2022    PROT 6.6 04/15/2023    ALBUMIN 4.0 04/15/2023    AST 17 04/15/2023    ALT 23 04/15/2023    ALKPHOS 75 04/15/2023    BILITOT 1.4 (H) 04/15/2023       LIPID PANEL -   Lab Results   Component Value Date    CHOL 101 04/15/2023    TRIG 93 04/15/2023    HDL 40.1 04/15/2023    CHHDL 2.5 04/15/2023    LDLF 42 04/15/2023    VLDL 19 04/15/2023       RENAL FUNCTION PANEL -   Lab Results   Component Value Date    GLUCOSE 114 (H) 04/15/2023     04/15/2023    K 4.3 04/15/2023     04/15/2023    CO2 25 04/15/2023    ANIONGAP 13 04/15/2023    BUN 26 (H) 04/15/2023    CREATININE 1.07 04/15/2023    GFRMALE 79 04/15/2023    CALCIUM 9.4 04/15/2023    PHOS 4.0 12/01/2022    ALBUMIN 4.0 04/15/2023        Lab Results   Component Value Date    BNP 8 11/30/2022    HGBA1C 5.9 (A) 04/15/2023       Last Cardiology Tests:  ECG:  No results found for this or any previous visit (from the past 4464 hour(s)).     Echo:  No results found for this or any previous visit from the past 1095 days.     Assessment and Plan    1. CAD status post inferior MI with drug-eluting stent to the mid RCA and residual moderate RCA and LAD disease. No chest pain symptoms today.  Previously on Brilinta but now discontinued.  He is on aspirin 81 mg daily.  May benefit from Plavix as patient is fairly asymptomatic we will continue current medical therapy.       Regional wall motion abnormalities appreciated on echocardiogram with mild hypokinesis of the inferior wall with a EF of 55% without significant valvular pathology. Patient completed cardiac rehab     2. Hypertension: Above goal today.  Advised patient monitor blood pressure closely.  Will adjust blood pressure meds as needed     3.  Hyperlipidemia: Patient is on high intensity statin therapy including Lipitor and Zetia.  Fasting lipid panel pending.     Mild carotid artery disease.       Alex Phan MD PhD

## 2024-02-20 NOTE — ASSESSMENT & PLAN NOTE
PATIENT EDUCATION/GOALS  Current A1c: [ 5.9% ] [ 4/15/2023 ]     Goals  Fasting B - 130 mg/dL  Postprandial BG: less than 180 mg/dL  A1c: less than 7%    Assessment/Plan  Checks blood sugar once weekly in the morning, fasting. Average 100-105.   Due for an updated A1C, patient plans to make appointment with PCP    Medication Changes:  CONTINUE:  Farxiga 10mg daily  Metformin 500mg daily

## 2024-04-24 DIAGNOSIS — E03.9 ACQUIRED HYPOTHYROIDISM: ICD-10-CM

## 2024-04-24 PROCEDURE — RXMED WILLOW AMBULATORY MEDICATION CHARGE

## 2024-04-25 ENCOUNTER — PHARMACY VISIT (OUTPATIENT)
Dept: PHARMACY | Facility: CLINIC | Age: 64
End: 2024-04-25
Payer: COMMERCIAL

## 2024-04-25 PROCEDURE — RXMED WILLOW AMBULATORY MEDICATION CHARGE

## 2024-04-25 RX ORDER — LEVOTHYROXINE SODIUM 25 UG/1
25 TABLET ORAL DAILY
Qty: 90 TABLET | Refills: 3 | Status: SHIPPED | OUTPATIENT
Start: 2024-04-25 | End: 2025-04-25

## 2024-04-26 ENCOUNTER — PHARMACY VISIT (OUTPATIENT)
Dept: PHARMACY | Facility: CLINIC | Age: 64
End: 2024-04-26
Payer: COMMERCIAL

## 2024-07-22 PROCEDURE — RXMED WILLOW AMBULATORY MEDICATION CHARGE

## 2024-07-23 ENCOUNTER — PHARMACY VISIT (OUTPATIENT)
Dept: PHARMACY | Facility: CLINIC | Age: 64
End: 2024-07-23
Payer: COMMERCIAL

## 2024-10-15 DIAGNOSIS — E11.9 TYPE 2 DIABETES MELLITUS WITHOUT COMPLICATION, WITHOUT LONG-TERM CURRENT USE OF INSULIN (MULTI): ICD-10-CM

## 2024-10-15 DIAGNOSIS — E78.5 HYPERLIPIDEMIA, UNSPECIFIED HYPERLIPIDEMIA TYPE: ICD-10-CM

## 2024-10-15 DIAGNOSIS — I10 BENIGN ESSENTIAL HYPERTENSION: ICD-10-CM

## 2024-10-16 PROCEDURE — RXMED WILLOW AMBULATORY MEDICATION CHARGE

## 2024-10-17 ENCOUNTER — PHARMACY VISIT (OUTPATIENT)
Dept: PHARMACY | Facility: CLINIC | Age: 64
End: 2024-10-17
Payer: COMMERCIAL

## 2024-10-17 PROCEDURE — RXMED WILLOW AMBULATORY MEDICATION CHARGE

## 2024-10-17 RX ORDER — METFORMIN HYDROCHLORIDE 500 MG/1
500 TABLET ORAL DAILY
Qty: 90 TABLET | Refills: 3 | Status: SHIPPED | OUTPATIENT
Start: 2024-10-17 | End: 2025-10-16

## 2024-10-17 RX ORDER — ATORVASTATIN CALCIUM 80 MG/1
80 TABLET, FILM COATED ORAL NIGHTLY
Qty: 90 TABLET | Refills: 3 | Status: SHIPPED | OUTPATIENT
Start: 2024-10-17 | End: 2025-10-16

## 2024-10-17 RX ORDER — OLMESARTAN MEDOXOMIL AND HYDROCHLOROTHIAZIDE 20/12.5 20; 12.5 MG/1; MG/1
1 TABLET ORAL
Qty: 90 TABLET | Refills: 3 | Status: SHIPPED | OUTPATIENT
Start: 2024-10-17 | End: 2025-10-16

## 2024-10-17 RX ORDER — CARVEDILOL 6.25 MG/1
6.25 TABLET ORAL 2 TIMES DAILY
Qty: 180 TABLET | Refills: 3 | Status: SHIPPED | OUTPATIENT
Start: 2024-10-17 | End: 2025-10-16

## 2024-10-17 RX ORDER — EZETIMIBE 10 MG/1
10 TABLET ORAL NIGHTLY
Qty: 90 TABLET | Refills: 3 | Status: SHIPPED | OUTPATIENT
Start: 2024-10-17 | End: 2025-10-16

## 2024-10-17 RX ORDER — DAPAGLIFLOZIN 10 MG/1
10 TABLET, FILM COATED ORAL DAILY
Qty: 90 TABLET | Refills: 3 | Status: SHIPPED | OUTPATIENT
Start: 2024-10-17 | End: 2025-10-16

## 2024-10-18 ENCOUNTER — PHARMACY VISIT (OUTPATIENT)
Dept: PHARMACY | Facility: CLINIC | Age: 64
End: 2024-10-18
Payer: COMMERCIAL

## 2025-01-22 PROCEDURE — RXMED WILLOW AMBULATORY MEDICATION CHARGE

## 2025-01-24 ENCOUNTER — PHARMACY VISIT (OUTPATIENT)
Dept: PHARMACY | Facility: CLINIC | Age: 65
End: 2025-01-24
Payer: COMMERCIAL

## 2025-01-28 ENCOUNTER — APPOINTMENT (OUTPATIENT)
Dept: PHARMACY | Facility: HOSPITAL | Age: 65
End: 2025-01-28
Payer: COMMERCIAL

## 2025-01-28 DIAGNOSIS — E11.9 TYPE 2 DIABETES MELLITUS WITHOUT COMPLICATION, WITHOUT LONG-TERM CURRENT USE OF INSULIN (MULTI): Primary | ICD-10-CM

## 2025-01-28 NOTE — PROGRESS NOTES
"[facility] ([facility])    Villa Bowser is a 64 y.o. male referred to Clinical Pharmacy Team to complete a comprehensive medication review (CMR) with a pharmacist as part of the [employer] Based [employer] (VBID) diabetes program.  Pharmacy team may also provide assistance in diabetes management per discussion with referring provider and/or endocrinology. Referring Provider: Lane Bustillos MD    Does patient follow with Endocrinology: No    Subjective   Not on File    [facility]  [address] [facility] [address] [facility] [address]  [facility] [address]  Phone: [phone] Fax: [phone]    Diabetes  He presents for his follow-up diabetic visit. He has type 2 diabetes mellitus. His disease course has been improving (well controlled). Risk factors for coronary artery disease include diabetes mellitus, hypertension, male sex and obesity (hx of ASCVD). An ACE inhibitor/angiotensin II receptor blocker is being taken. Eye exam is current (3/2024).     Pertinent PMH Review:  PMH of Pancreatitis: No  PMH of Retinopathy: No  PMH of Urinary Tract Infections: No  PMH of MTC: No    HOME MONITORING  Monitoring Device: Fingerstick glucometer, OneTouch Ultra  Monitoring Frequency: once weekly    Current home BG readings: AM FBG - around 100 mg/dL  Any episodes of hypoglycemia? No, denies .     Objective     BP Readings from Last 6 Encounters:   08/22/23 138/85   07/17/23 133/77   04/17/23 143/84   01/17/23 115/80   12/23/22 150/88   10/18/22 138/89      Daily Weight  02/20/24 : 147 kg (323 lb)  08/22/23 : 134 kg (296 lb 4 oz)  07/17/23 : 134 kg (295 lb)  04/17/23 : 134 kg (295 lb)  01/17/23 : 132 kg (290 lb)  12/23/22 : 132 kg (291 lb)     Estimated body mass index is 47.7 kg/m² as calculated from the following:    Height as of 2/20/24: 1.753 m (5' 9\").    Weight as of 2/20/24: 147 kg (323 lb).   LAB  Lab Results   Component Value Date    BILITOT 1.4 (H) 04/15/2023    CALCIUM 9.4 04/15/2023    " CO2 25 04/15/2023     04/15/2023    CREATININE 1.07 04/15/2023    GLUCOSE 114 (H) 04/15/2023    ALKPHOS 75 04/15/2023    K 4.3 04/15/2023    PROT 6.6 04/15/2023     04/15/2023    AST 17 04/15/2023    ALT 23 04/15/2023    BUN 26 (H) 04/15/2023    ANIONGAP 13 04/15/2023    MG 1.94 12/01/2022    PHOS 4.0 12/01/2022    ALBUMIN 4.0 04/15/2023    GFRMALE 79 04/15/2023     Lab Results   Component Value Date    TRIG 93 04/15/2023    CHOL 101 04/15/2023    HDL 40.1 04/15/2023     Lab Results   Component Value Date    HGBA1C 5.9 (A) 04/15/2023       Current Outpatient Medications on File Prior to Visit   Medication Sig Dispense Refill    aspirin 81 mg EC tablet Take 1 tablet (81 mg) by mouth once daily.      atorvastatin (Lipitor) 80 mg tablet TAKE 1 TABLET BY MOUTH AT BEDTIME 90 tablet 3    carvedilol (Coreg) 6.25 mg tablet TAKE 1 TABLET BY MOUTH TWO TIMES A  tablet 3    dapagliflozin propanediol (Farxiga) 10 mg TAKE 1 TABLET BY MOUTH ONCE DAILY 90 tablet 3    ezetimibe (Zetia) 10 mg tablet TAKE 1 TABLET BY MOUTH IN THE EVENING 90 tablet 3    levothyroxine (Synthroid, Levoxyl) 25 mcg tablet TAKE 1 TABLET BY MOUTH ONCE DAILY 90 tablet 3    metFORMIN (Glucophage) 500 mg tablet TAKE 1 TABLET BY MOUTH ONCE DAILY 90 tablet 3    olmesartan-hydrochlorothiazide (BENIcar HCT) 20-12.5 mg tablet TAKE 1 TABLET BY MOUTH EVERY MORNING 90 tablet 3     No current facility-administered medications on file prior to visit.      MEDICATION RECONCILIATION  Added: none  Changed: none  Removed: none    Drug Interactions:  None warranting change in therapy at this time    CURRENT DIABETES PHARMACOTHERAPY  Farxiga 10 mg tab - one tab (10 mg) PO daily  Metformin 500 mg tab - one tab (500 mg) PO daily    PREVENTATIVE PHARMACOTHERAPY  Statin? yes  LDL: at goal (< 70 mg/dL)  ACE-I/ARB? yes  BP: not at goal, < 130/80  UACR: normal (< 30 mg/g)  Aspirin?  yes    Assessment/Plan   Problem List Items Addressed This Visit    None      Diabetes:  Patient's Type 2 diabetes is well controlled with most recent A1c of 5.9% on 4/15/23 (Goal < 7%). Overall, doing well on current regimen, denies side effects. No changes recommended at today's visit. Reminded pt he is due for annual PCP visit and routine lab work.  Continue:  Farxiga 10 mg tab - one tab (10 mg) PO daily  Metformin 500 mg tab - one tab (500 mg) PO daily  Comorbidity/Complication Regimen: appropriate    Comprehensive Medication Review:  Reviewed all medications on patient medication list in EMR. Discussed indication, administration, MOA and possible side effects to medications. Answered all patient questions and concerns.   Patient denies side effects with medications.   Adherence is expected to be Good.   Additional concerns with medication list: none    VBID Employee Diabetes Program Enrollment: Renewal  Patient enrolled in  Employee diabetes program for $0 co-pays on diabetes medications/supplies. Renewal should be active in 2-4 weeks.  Requested VBID enrollment date: 01/28/25  PharmD Management Level: Level 2   Pharmacy fill location: [facility]      Follow up: 10-12 months for renewal    Quinn GreenwoodD     Continue all meds under the continuation of care with the referring provider and clinical pharmacy team. Patient/Caregiver was informed they may decline to participate or withdraw from participation in pharmacy services at any time.

## 2025-02-03 PROBLEM — J20.9 ACUTE BRONCHITIS: Status: RESOLVED | Noted: 2024-02-15 | Resolved: 2025-02-03

## 2025-02-03 PROBLEM — R07.9 CHEST PAIN: Status: RESOLVED | Noted: 2022-11-30 | Resolved: 2025-02-03

## 2025-02-24 PROCEDURE — RXMED WILLOW AMBULATORY MEDICATION CHARGE

## 2025-02-25 ENCOUNTER — OFFICE VISIT (OUTPATIENT)
Dept: CARDIOLOGY | Facility: CLINIC | Age: 65
End: 2025-02-25
Payer: COMMERCIAL

## 2025-02-25 VITALS
HEIGHT: 69 IN | DIASTOLIC BLOOD PRESSURE: 85 MMHG | SYSTOLIC BLOOD PRESSURE: 145 MMHG | OXYGEN SATURATION: 94 % | HEART RATE: 98 BPM | WEIGHT: 315 LBS | BODY MASS INDEX: 46.65 KG/M2

## 2025-02-25 DIAGNOSIS — E78.49 OTHER HYPERLIPIDEMIA: ICD-10-CM

## 2025-02-25 DIAGNOSIS — E66.01 MORBID OBESITY WITH BMI OF 40.0-44.9, ADULT (MULTI): ICD-10-CM

## 2025-02-25 DIAGNOSIS — I25.10 CORONARY ARTERY DISEASE INVOLVING NATIVE CORONARY ARTERY OF NATIVE HEART WITHOUT ANGINA PECTORIS: ICD-10-CM

## 2025-02-25 DIAGNOSIS — Z95.5 STATUS POST INSERTION OF DRUG-ELUTING STENT INTO RIGHT CORONARY ARTERY FOR CORONARY ARTERY DISEASE: ICD-10-CM

## 2025-02-25 DIAGNOSIS — I10 PRIMARY HYPERTENSION: Primary | ICD-10-CM

## 2025-02-25 PROCEDURE — 99213 OFFICE O/P EST LOW 20 MIN: CPT | Performed by: STUDENT IN AN ORGANIZED HEALTH CARE EDUCATION/TRAINING PROGRAM

## 2025-02-25 PROCEDURE — 1036F TOBACCO NON-USER: CPT | Performed by: STUDENT IN AN ORGANIZED HEALTH CARE EDUCATION/TRAINING PROGRAM

## 2025-02-25 PROCEDURE — 99213 OFFICE O/P EST LOW 20 MIN: CPT | Mod: 25 | Performed by: STUDENT IN AN ORGANIZED HEALTH CARE EDUCATION/TRAINING PROGRAM

## 2025-02-25 PROCEDURE — 3077F SYST BP >= 140 MM HG: CPT | Performed by: STUDENT IN AN ORGANIZED HEALTH CARE EDUCATION/TRAINING PROGRAM

## 2025-02-25 PROCEDURE — 3008F BODY MASS INDEX DOCD: CPT | Performed by: STUDENT IN AN ORGANIZED HEALTH CARE EDUCATION/TRAINING PROGRAM

## 2025-02-25 PROCEDURE — 93010 ELECTROCARDIOGRAM REPORT: CPT | Performed by: STUDENT IN AN ORGANIZED HEALTH CARE EDUCATION/TRAINING PROGRAM

## 2025-02-25 PROCEDURE — 93005 ELECTROCARDIOGRAM TRACING: CPT | Performed by: STUDENT IN AN ORGANIZED HEALTH CARE EDUCATION/TRAINING PROGRAM

## 2025-02-25 PROCEDURE — 3079F DIAST BP 80-89 MM HG: CPT | Performed by: STUDENT IN AN ORGANIZED HEALTH CARE EDUCATION/TRAINING PROGRAM

## 2025-02-25 NOTE — PROGRESS NOTES
"Chief Complaint:   Follow-up     History Of Present Illness:    Villa Bowser is a 65 y.o. male returns for yearly appointment.  Is doing well since his last appointment.  Denies chest pain shortness of breath or palpitations.  Compliant with current medical therapy.  No recent hospitalizations.  Scheduled see his primary care physician near future.  Initial blood pressure 145/85 with repeat of 140/74.  Compliant with current medical therapy.  No side effects.  Baseline ECG normal sinus rhythm.     Last Recorded Vitals:  Vitals:    02/25/25 1537   BP: 145/85   BP Location: Left arm   Patient Position: Sitting   Pulse: 98   SpO2: 94%   Weight: (!) 155 kg (342 lb)   Height: 1.753 m (5' 9\")       Review of Systems  ROS      Allergies:  Patient has no known allergies.    Outpatient Medications:  Current Outpatient Medications   Medication Instructions    aspirin 81 mg, Daily    atorvastatin (Lipitor) 80 mg tablet TAKE 1 TABLET BY MOUTH AT BEDTIME    carvedilol (Coreg) 6.25 mg tablet TAKE 1 TABLET BY MOUTH TWO TIMES A DAY    dapagliflozin propanediol (Farxiga) 10 mg TAKE 1 TABLET BY MOUTH ONCE DAILY    ezetimibe (Zetia) 10 mg tablet TAKE 1 TABLET BY MOUTH IN THE EVENING    levothyroxine (Synthroid, Levoxyl) 25 mcg tablet TAKE 1 TABLET BY MOUTH ONCE DAILY    metFORMIN (Glucophage) 500 mg tablet TAKE 1 TABLET BY MOUTH ONCE DAILY    olmesartan-hydrochlorothiazide (BENIcar HCT) 20-12.5 mg tablet TAKE 1 TABLET BY MOUTH EVERY MORNING       Physical Exam:  General: No acute distress,  A&O x3, morbidly obese male   skin: Warm and dry  Neck: JVD is not elevated  ENT: Moist mucous membranes no lesions appreciated  Pulmonary: CTAB  Cards: Regular rate rhythm, no murmurs gallops or rubs appreciated normal S1-S2  Abdomen: Soft nontender nondistended  Extremities: No edema or cyanosis  Psych: Appropriate mood and affect        Last Labs:  CBC -  Lab Results   Component Value Date    WBC 8.5 12/01/2022    HGB 14.0 12/01/2022    " "HCT 42.5 12/01/2022    MCV 85 12/01/2022     12/01/2022       CMP -  Lab Results   Component Value Date    CALCIUM 9.4 04/15/2023    PHOS 4.0 12/01/2022    PROT 6.6 04/15/2023    ALBUMIN 4.0 04/15/2023    AST 17 04/15/2023    ALT 23 04/15/2023    ALKPHOS 75 04/15/2023    BILITOT 1.4 (H) 04/15/2023       LIPID PANEL -   Lab Results   Component Value Date    CHOL 101 04/15/2023    TRIG 93 04/15/2023    HDL 40.1 04/15/2023    CHHDL 2.5 04/15/2023    LDLF 42 04/15/2023    VLDL 19 04/15/2023       RENAL FUNCTION PANEL -   Lab Results   Component Value Date    GLUCOSE 114 (H) 04/15/2023     04/15/2023    K 4.3 04/15/2023     04/15/2023    CO2 25 04/15/2023    ANIONGAP 13 04/15/2023    BUN 26 (H) 04/15/2023    CREATININE 1.07 04/15/2023    GFRMALE 79 04/15/2023    CALCIUM 9.4 04/15/2023    PHOS 4.0 12/01/2022    ALBUMIN 4.0 04/15/2023        Lab Results   Component Value Date    BNP 8 11/30/2022    HGBA1C 5.9 (A) 04/15/2023       Last Cardiology Tests:  ECG:  No results found for this or any previous visit (from the past 4464 hours).     Echo:  No results found for this or any previous visit from the past 1095 days.       Ejection Fractions:  No results found for: \"EF\"    Assessment and Plan    1. CAD status post inferior MI with drug-eluting stent to the mid RCA and residual moderate RCA and LAD disease. No chest pain symptoms today.  Previously on Brilinta but now discontinued.  He is on aspirin 81 mg daily.        Regional wall motion abnormalities appreciated on echocardiogram with mild hypokinesis of the inferior wall with a EF of 55% without significant valvular pathology. Patient completed cardiac rehab     2. Hypertension: Slightly above goal today.  Continue heart healthy plant-based diet and exercise.  Continue current medical therapy.       3. Hyperlipidemia: Patient is on high intensity statin therapy including Lipitor and Zetia.  Labs through PCP     Mild carotid artery disease. "     Follow-up in 1 year    (This note was generated with voice recognition software and may contain errors including spelling, grammar, syntax and missed recognition of what was dictated, of which may not have been fully corrected)     Alex Phan MD PhD

## 2025-02-26 ENCOUNTER — PHARMACY VISIT (OUTPATIENT)
Dept: PHARMACY | Facility: CLINIC | Age: 65
End: 2025-02-26
Payer: COMMERCIAL

## 2025-02-28 LAB
ATRIAL RATE: 95 BPM
P AXIS: 58 DEGREES
P OFFSET: 184 MS
P ONSET: 128 MS
PR INTERVAL: 182 MS
Q ONSET: 219 MS
QRS COUNT: 15 BEATS
QRS DURATION: 86 MS
QT INTERVAL: 366 MS
QTC CALCULATION(BAZETT): 459 MS
QTC FREDERICIA: 426 MS
R AXIS: 63 DEGREES
T AXIS: 69 DEGREES
T OFFSET: 402 MS
VENTRICULAR RATE: 95 BPM

## 2025-04-17 DIAGNOSIS — I10 BENIGN ESSENTIAL HYPERTENSION: ICD-10-CM

## 2025-04-17 DIAGNOSIS — E78.49 OTHER HYPERLIPIDEMIA: ICD-10-CM

## 2025-04-17 DIAGNOSIS — E66.01 MORBID OBESITY WITH BMI OF 40.0-44.9, ADULT (MULTI): ICD-10-CM

## 2025-04-17 DIAGNOSIS — I25.10 CORONARY ARTERY DISEASE, UNSPECIFIED VESSEL OR LESION TYPE, UNSPECIFIED WHETHER ANGINA PRESENT, UNSPECIFIED WHETHER NATIVE OR TRANSPLANTED HEART: ICD-10-CM

## 2025-04-17 DIAGNOSIS — E55.9 VITAMIN D DEFICIENCY: ICD-10-CM

## 2025-04-28 DIAGNOSIS — E03.9 ACQUIRED HYPOTHYROIDISM: ICD-10-CM

## 2025-04-29 PROCEDURE — RXMED WILLOW AMBULATORY MEDICATION CHARGE

## 2025-04-29 RX ORDER — LEVOTHYROXINE SODIUM 25 UG/1
25 TABLET ORAL DAILY
Qty: 90 TABLET | Refills: 3 | Status: SHIPPED | OUTPATIENT
Start: 2025-04-29 | End: 2026-04-29

## 2025-04-30 ENCOUNTER — PHARMACY VISIT (OUTPATIENT)
Dept: PHARMACY | Facility: CLINIC | Age: 65
End: 2025-04-30
Payer: COMMERCIAL

## 2025-05-04 LAB
25(OH)D3+25(OH)D2 SERPL-MCNC: 30 NG/ML (ref 30–100)
ALBUMIN SERPL-MCNC: 4.2 G/DL (ref 3.6–5.1)
ALBUMIN/CREAT UR: NORMAL
ALP SERPL-CCNC: 69 U/L (ref 35–144)
ALT SERPL-CCNC: 24 U/L (ref 9–46)
ANION GAP SERPL CALCULATED.4IONS-SCNC: 11 MMOL/L (CALC) (ref 7–17)
AST SERPL-CCNC: 19 U/L (ref 10–35)
BASOPHILS # BLD AUTO: 62 CELLS/UL (ref 0–200)
BASOPHILS NFR BLD AUTO: 1 %
BILIRUB SERPL-MCNC: 1.3 MG/DL (ref 0.2–1.2)
BUN SERPL-MCNC: 25 MG/DL (ref 7–25)
CALCIUM SERPL-MCNC: 9.1 MG/DL (ref 8.6–10.3)
CHLORIDE SERPL-SCNC: 103 MMOL/L (ref 98–110)
CHOLEST SERPL-MCNC: 104 MG/DL
CHOLEST/HDLC SERPL: 2.3 (CALC)
CK SERPL-CCNC: 99 U/L (ref 22–308)
CO2 SERPL-SCNC: 27 MMOL/L (ref 20–32)
CREAT SERPL-MCNC: 0.96 MG/DL (ref 0.7–1.35)
CREAT UR-MCNC: NORMAL MG/DL
EGFRCR SERPLBLD CKD-EPI 2021: 89 ML/MIN/1.73M2
EOSINOPHIL # BLD AUTO: 149 CELLS/UL (ref 15–500)
EOSINOPHIL NFR BLD AUTO: 2.4 %
ERYTHROCYTE [DISTWIDTH] IN BLOOD BY AUTOMATED COUNT: 15.1 % (ref 11–15)
EST. AVERAGE GLUCOSE BLD GHB EST-MCNC: 148 MG/DL
EST. AVERAGE GLUCOSE BLD GHB EST-SCNC: 8.2 MMOL/L
GLUCOSE SERPL-MCNC: 138 MG/DL (ref 65–99)
HBA1C MFR BLD: 6.8 %
HCT VFR BLD AUTO: 45.9 % (ref 38.5–50)
HDLC SERPL-MCNC: 45 MG/DL
HGB BLD-MCNC: 14.6 G/DL (ref 13.2–17.1)
LDLC SERPL CALC-MCNC: 39 MG/DL (CALC)
LYMPHOCYTES # BLD AUTO: 1680 CELLS/UL (ref 850–3900)
LYMPHOCYTES NFR BLD AUTO: 27.1 %
MCH RBC QN AUTO: 27.2 PG (ref 27–33)
MCHC RBC AUTO-ENTMCNC: 31.8 G/DL (ref 32–36)
MCV RBC AUTO: 85.5 FL (ref 80–100)
MICROALBUMIN UR-MCNC: NORMAL
MONOCYTES # BLD AUTO: 645 CELLS/UL (ref 200–950)
MONOCYTES NFR BLD AUTO: 10.4 %
NEUTROPHILS # BLD AUTO: 3664 CELLS/UL (ref 1500–7800)
NEUTROPHILS NFR BLD AUTO: 59.1 %
NONHDLC SERPL-MCNC: 59 MG/DL (CALC)
PLATELET # BLD AUTO: 197 THOUSAND/UL (ref 140–400)
PMV BLD REES-ECKER: 11.6 FL (ref 7.5–12.5)
POTASSIUM SERPL-SCNC: 4.5 MMOL/L (ref 3.5–5.3)
PROT SERPL-MCNC: 6.8 G/DL (ref 6.1–8.1)
PSA SERPL-MCNC: 0.68 NG/ML
RBC # BLD AUTO: 5.37 MILLION/UL (ref 4.2–5.8)
SODIUM SERPL-SCNC: 141 MMOL/L (ref 135–146)
TESTOST FREE SERPL-MCNC: NORMAL PG/ML
TESTOST SERPL-MCNC: NORMAL NG/DL
TRIGL SERPL-MCNC: 118 MG/DL
TSH SERPL-ACNC: 3.23 MIU/L (ref 0.4–4.5)
WBC # BLD AUTO: 6.2 THOUSAND/UL (ref 3.8–10.8)

## 2025-05-05 ENCOUNTER — TELEPHONE (OUTPATIENT)
Dept: PRIMARY CARE | Facility: CLINIC | Age: 65
End: 2025-05-05
Payer: COMMERCIAL

## 2025-05-05 NOTE — TELEPHONE ENCOUNTER
----- Message from Lane Bustillos sent at 5/5/2025  8:53 AM EDT -----  Anemia blood glucose 138 bilirubin 1.3 A1c 6.8 LDL 39 prostate normal testosterone pending vitamin D normal advised follow-up advise a yearly adult physical checkup in office plus get Pneumovax 20   Shingrix COVID-vaccine from pharmacy  ----- Message -----  From: [employer] Results In  Sent: 5/4/2025   2:55 AM EDT  To: Lane Bustillos MD

## 2025-05-08 LAB
25(OH)D3+25(OH)D2 SERPL-MCNC: 30 NG/ML (ref 30–100)
ALBUMIN SERPL-MCNC: 4.2 G/DL (ref 3.6–5.1)
ALBUMIN/CREAT UR: 32 MG/G CREAT
ALP SERPL-CCNC: 69 U/L (ref 35–144)
ALT SERPL-CCNC: 24 U/L (ref 9–46)
ANION GAP SERPL CALCULATED.4IONS-SCNC: 11 MMOL/L (CALC) (ref 7–17)
AST SERPL-CCNC: 19 U/L (ref 10–35)
BASOPHILS # BLD AUTO: 62 CELLS/UL (ref 0–200)
BASOPHILS NFR BLD AUTO: 1 %
BILIRUB SERPL-MCNC: 1.3 MG/DL (ref 0.2–1.2)
BUN SERPL-MCNC: 25 MG/DL (ref 7–25)
CALCIUM SERPL-MCNC: 9.1 MG/DL (ref 8.6–10.3)
CHLORIDE SERPL-SCNC: 103 MMOL/L (ref 98–110)
CHOLEST SERPL-MCNC: 104 MG/DL
CHOLEST/HDLC SERPL: 2.3 (CALC)
CK SERPL-CCNC: 99 U/L (ref 22–308)
CO2 SERPL-SCNC: 27 MMOL/L (ref 20–32)
CREAT SERPL-MCNC: 0.96 MG/DL (ref 0.7–1.35)
CREAT UR-MCNC: 182 MG/DL (ref 20–320)
EGFRCR SERPLBLD CKD-EPI 2021: 89 ML/MIN/1.73M2
EOSINOPHIL # BLD AUTO: 149 CELLS/UL (ref 15–500)
EOSINOPHIL NFR BLD AUTO: 2.4 %
ERYTHROCYTE [DISTWIDTH] IN BLOOD BY AUTOMATED COUNT: 15.1 % (ref 11–15)
EST. AVERAGE GLUCOSE BLD GHB EST-MCNC: 148 MG/DL
EST. AVERAGE GLUCOSE BLD GHB EST-SCNC: 8.2 MMOL/L
GLUCOSE SERPL-MCNC: 138 MG/DL (ref 65–99)
HBA1C MFR BLD: 6.8 %
HCT VFR BLD AUTO: 45.9 % (ref 38.5–50)
HDLC SERPL-MCNC: 45 MG/DL
HGB BLD-MCNC: 14.6 G/DL (ref 13.2–17.1)
LDLC SERPL CALC-MCNC: 39 MG/DL (CALC)
LYMPHOCYTES # BLD AUTO: 1680 CELLS/UL (ref 850–3900)
LYMPHOCYTES NFR BLD AUTO: 27.1 %
MCH RBC QN AUTO: 27.2 PG (ref 27–33)
MCHC RBC AUTO-ENTMCNC: 31.8 G/DL (ref 32–36)
MCV RBC AUTO: 85.5 FL (ref 80–100)
MICROALBUMIN UR-MCNC: 5.8 MG/DL
MONOCYTES # BLD AUTO: 645 CELLS/UL (ref 200–950)
MONOCYTES NFR BLD AUTO: 10.4 %
NEUTROPHILS # BLD AUTO: 3664 CELLS/UL (ref 1500–7800)
NEUTROPHILS NFR BLD AUTO: 59.1 %
NONHDLC SERPL-MCNC: 59 MG/DL (CALC)
PLATELET # BLD AUTO: 197 THOUSAND/UL (ref 140–400)
PMV BLD REES-ECKER: 11.6 FL (ref 7.5–12.5)
POTASSIUM SERPL-SCNC: 4.5 MMOL/L (ref 3.5–5.3)
PROT SERPL-MCNC: 6.8 G/DL (ref 6.1–8.1)
PSA SERPL-MCNC: 0.68 NG/ML
RBC # BLD AUTO: 5.37 MILLION/UL (ref 4.2–5.8)
SODIUM SERPL-SCNC: 141 MMOL/L (ref 135–146)
TESTOST FREE SERPL-MCNC: 16.9 PG/ML (ref 35–155)
TESTOST SERPL-MCNC: 161 NG/DL (ref 250–1100)
TRIGL SERPL-MCNC: 118 MG/DL
TSH SERPL-ACNC: 3.23 MIU/L (ref 0.4–4.5)
WBC # BLD AUTO: 6.2 THOUSAND/UL (ref 3.8–10.8)

## 2025-05-08 ASSESSMENT — PROMIS GLOBAL HEALTH SCALE
RATE_SOCIAL_SATISFACTION: EXCELLENT
CARRYOUT_PHYSICAL_ACTIVITIES: MOSTLY
RATE_QUALITY_OF_LIFE: EXCELLENT
RATE_AVERAGE_PAIN: 2
RATE_GENERAL_HEALTH: VERY GOOD
RATE_AVERAGE_FATIGUE: MILD
EMOTIONAL_PROBLEMS: NEVER
RATE_MENTAL_HEALTH: EXCELLENT
RATE_PHYSICAL_HEALTH: VERY GOOD
CARRYOUT_SOCIAL_ACTIVITIES: EXCELLENT

## 2025-05-12 PROCEDURE — RXMED WILLOW AMBULATORY MEDICATION CHARGE

## 2025-05-15 ENCOUNTER — PHARMACY VISIT (OUTPATIENT)
Dept: PHARMACY | Facility: CLINIC | Age: 65
End: 2025-05-15
Payer: COMMERCIAL

## 2025-05-15 ENCOUNTER — APPOINTMENT (OUTPATIENT)
Dept: PRIMARY CARE | Facility: CLINIC | Age: 65
End: 2025-05-15
Payer: COMMERCIAL

## 2025-05-15 VITALS
BODY MASS INDEX: 46.65 KG/M2 | WEIGHT: 315 LBS | HEIGHT: 69 IN | DIASTOLIC BLOOD PRESSURE: 87 MMHG | SYSTOLIC BLOOD PRESSURE: 139 MMHG | OXYGEN SATURATION: 96 % | TEMPERATURE: 97.2 F

## 2025-05-15 DIAGNOSIS — G47.30 SLEEP APNEA, UNSPECIFIED TYPE: ICD-10-CM

## 2025-05-15 DIAGNOSIS — E66.01 MORBID OBESITY WITH BMI OF 40.0-44.9, ADULT (MULTI): ICD-10-CM

## 2025-05-15 DIAGNOSIS — D12.2 ADENOMATOUS POLYP OF ASCENDING COLON: ICD-10-CM

## 2025-05-15 DIAGNOSIS — Z95.5 STATUS POST INSERTION OF DRUG-ELUTING STENT INTO RIGHT CORONARY ARTERY FOR CORONARY ARTERY DISEASE: ICD-10-CM

## 2025-05-15 DIAGNOSIS — E11.9 TYPE 2 DIABETES MELLITUS WITHOUT COMPLICATION, WITHOUT LONG-TERM CURRENT USE OF INSULIN: ICD-10-CM

## 2025-05-15 DIAGNOSIS — Z00.01 ANNUAL VISIT FOR GENERAL ADULT MEDICAL EXAMINATION WITH ABNORMAL FINDINGS: Primary | ICD-10-CM

## 2025-05-15 DIAGNOSIS — R79.89 LOW TESTOSTERONE IN MALE: ICD-10-CM

## 2025-05-15 DIAGNOSIS — E03.9 ACQUIRED HYPOTHYROIDISM: ICD-10-CM

## 2025-05-15 PROBLEM — E78.5 HYPERLIPIDEMIA: Status: RESOLVED | Noted: 2023-02-14 | Resolved: 2025-05-15

## 2025-05-15 PROBLEM — I10 BENIGN ESSENTIAL HYPERTENSION: Status: RESOLVED | Noted: 2023-02-14 | Resolved: 2025-05-15

## 2025-05-15 PROBLEM — I25.10 CORONARY ARTERY DISEASE: Status: RESOLVED | Noted: 2023-02-14 | Resolved: 2025-05-15

## 2025-05-15 PROBLEM — M25.562 CHRONIC PAIN OF BOTH KNEES: Status: RESOLVED | Noted: 2024-02-15 | Resolved: 2025-05-15

## 2025-05-15 PROBLEM — M25.561 CHRONIC PAIN OF BOTH KNEES: Status: RESOLVED | Noted: 2024-02-15 | Resolved: 2025-05-15

## 2025-05-15 PROBLEM — H90.3 SENSORINEURAL HEARING LOSS (SNHL) OF BOTH EARS: Status: RESOLVED | Noted: 2023-02-14 | Resolved: 2025-05-15

## 2025-05-15 PROBLEM — E55.9 VITAMIN D DEFICIENCY: Status: RESOLVED | Noted: 2023-02-14 | Resolved: 2025-05-15

## 2025-05-15 PROBLEM — I22.2: Status: RESOLVED | Noted: 2023-02-14 | Resolved: 2025-05-15

## 2025-05-15 PROBLEM — G89.29 CHRONIC PAIN OF BOTH KNEES: Status: RESOLVED | Noted: 2024-02-15 | Resolved: 2025-05-15

## 2025-05-15 PROBLEM — I10 HYPERTENSION: Status: RESOLVED | Noted: 2024-02-15 | Resolved: 2025-05-15

## 2025-05-15 PROBLEM — K58.9 IBS (IRRITABLE BOWEL SYNDROME): Status: RESOLVED | Noted: 2023-02-14 | Resolved: 2025-05-15

## 2025-05-15 PROCEDURE — 3079F DIAST BP 80-89 MM HG: CPT | Performed by: INTERNAL MEDICINE

## 2025-05-15 PROCEDURE — 3075F SYST BP GE 130 - 139MM HG: CPT | Performed by: INTERNAL MEDICINE

## 2025-05-15 PROCEDURE — 3008F BODY MASS INDEX DOCD: CPT | Performed by: INTERNAL MEDICINE

## 2025-05-15 PROCEDURE — 1036F TOBACCO NON-USER: CPT | Performed by: INTERNAL MEDICINE

## 2025-05-15 PROCEDURE — 99213 OFFICE O/P EST LOW 20 MIN: CPT | Performed by: INTERNAL MEDICINE

## 2025-05-15 PROCEDURE — 99396 PREV VISIT EST AGE 40-64: CPT | Performed by: INTERNAL MEDICINE

## 2025-05-15 RX ORDER — TIRZEPATIDE 7.5 MG/.5ML
7.5 INJECTION, SOLUTION SUBCUTANEOUS WEEKLY
Qty: 2 ML | Refills: 0 | Status: SHIPPED | OUTPATIENT
Start: 2025-05-15

## 2025-05-15 RX ORDER — TIRZEPATIDE 10 MG/.5ML
10 INJECTION, SOLUTION SUBCUTANEOUS WEEKLY
Qty: 2 ML | Refills: 0 | Status: SHIPPED | OUTPATIENT
Start: 2025-05-15

## 2025-05-15 RX ORDER — TIRZEPATIDE 5 MG/.5ML
5 INJECTION, SOLUTION SUBCUTANEOUS WEEKLY
Qty: 2 ML | Refills: 0 | Status: SHIPPED | OUTPATIENT
Start: 2025-05-15

## 2025-05-15 ASSESSMENT — PATIENT HEALTH QUESTIONNAIRE - PHQ9
2. FEELING DOWN, DEPRESSED OR HOPELESS: NOT AT ALL
SUM OF ALL RESPONSES TO PHQ9 QUESTIONS 1 AND 2: 0
1. LITTLE INTEREST OR PLEASURE IN DOING THINGS: NOT AT ALL

## 2025-05-15 NOTE — PROGRESS NOTES
Subjective   Patient ID: Villa Bowser is a 65 y.o. male who presents for Annual Exam (Go over blood work /Discuss starting on Monjaro ).    Assessment/Plan     Problem List Items Addressed This Visit       Sleep apnea    Diabetes mellitus (Multi)    Morbid obesity with BMI of 40.0-44.9, adult (Multi)    Acquired hypothyroidism    Colon polyp    Status post insertion of drug-eluting stent into right coronary artery for coronary artery disease    Annual visit for general adult medical examination with abnormal findings - Primary    Low testosterone in male       HPI 62-year-old patient  with 3 children    2 brother 1 sister positive for obesity    Mother father  from the old age and heart problem    Personal history of metabolic syndrome hypertension hyperlipidemia hypothyroidism proteinuria erectile dysfunction    Testosterone is 161    Hemoglobin A1c 6.8    Arthralgia myalgia fatigue tired weakness suggestive of the hypogonadism hypothyroidism and diabetes with complication    Negative for fall    Negative for hematuria rectal bleeding    Negative for suicide    Advised to get vaccination from [facility]    Refer patient to urology for testosterone supplements    Heart disease aspirin 81 mg a day    Hyperlipidemia Lipitor 80 mg a day    Heart disease continue Farxiga    Hypothyroidism only Synthroid    Proteinuria Benicar    Diabetes Mounjaro    Follow-up personal family history negative for pancreatitis or thyroid parathyroid cancer  Medical History[1]  Surgical History[2]  Allergies[3]  Current Medications[4]  Family History[5]  Social History[6]  Immunization History   Administered Date(s) Administered    Flu vaccine (IIV4), preservative free *Check age/dose* 10/24/2021    Influenza, Unspecified 09/15/2009, 10/20/2016, 2019, 2020, 10/18/2022    Influenza, seasonal, injectable 10/30/2012, 10/20/2016, 10/18/2022    Pfizer COVID-19 vaccine, 12 years and older, (30mcg/0.3mL) (Comirnaty)  "10/15/2023    Pfizer Gray Cap SARS-CoV-2 05/17/2022    Pfizer Purple Cap SARS-CoV-2 03/17/2021, 04/07/2021, 12/21/2021, 05/17/2022    Pneumococcal polysaccharide vaccine, 23-valent, age 2 years and older (PNEUMOVAX 23) 10/30/2012    Tdap vaccine, age 7 year and older (BOOSTRIX, ADACEL) 07/30/2018    Zoster vaccine, recombinant, adult (SHINGRIX) 09/04/2020    Zoster, Unspecified 09/04/2020       Review of Systems  Review of systems is otherwise negative unless stated above or in history of present illness.    Objective   Visit Vitals  /87 (BP Location: Left arm, Patient Position: Sitting)   Temp 36.2 °C (97.2 °F)   Ht 1.753 m (5' 9\")   Wt (!) 156 kg (345 lb)   SpO2 96%   BMI 50.95 kg/m²   Smoking Status Former   BSA 2.76 m²     Physical Exam  Constitutional: BMI 50     General: not in acute distress.   HENT:      Head: Normocephalic and atraumatic.      Nose: Nose normal.   Eyes: No jaundice     Extraocular Movements: Extraocular movements intact.      Conjunctiva/sclera: Conjunctivae normal.   Cardiovascular: S4     Rate and Rhythm: Normal rate ,  No M/R/G  Pulmonary:      Effort: Pulmonary effort is normal.      Breath sounds: Normal, Bilat Equal AE  Skin:     General: Skin is warm.   Neurological: Neuralgia     Mental Status: He is alert and oriented to person, place, and time.   Psychiatric:         Mood and Affect: Mood normal.         Behavior: Behavior normal.   Musculoskeletal   FROM in all extremitirs,  Joint-no swelling or tenderness    Orders Only on 04/17/2025   Component Date Value Ref Range Status    WHITE BLOOD CELL COUNT 05/03/2025 6.2  3.8 - 10.8 Thousand/uL Final    RED BLOOD CELL COUNT 05/03/2025 5.37  4.20 - 5.80 Million/uL Final    HEMOGLOBIN 05/03/2025 14.6  13.2 - 17.1 g/dL Final    HEMATOCRIT 05/03/2025 45.9  38.5 - 50.0 % Final    MCV 05/03/2025 85.5  80.0 - 100.0 fL Final    MCH 05/03/2025 27.2  27.0 - 33.0 pg Final    MCHC 05/03/2025 31.8 (L)  32.0 - 36.0 g/dL Final    RDW 05/03/2025 " 15.1 (H)  11.0 - 15.0 % Final    PLATELET COUNT 05/03/2025 197  140 - 400 Thousand/uL Final    MPV 05/03/2025 11.6  7.5 - 12.5 fL Final    ABSOLUTE NEUTROPHILS 05/03/2025 3,664  1,500 - 7,800 cells/uL Final    ABSOLUTE LYMPHOCYTES 05/03/2025 1,680  850 - 3,900 cells/uL Final    ABSOLUTE MONOCYTES 05/03/2025 645  200 - 950 cells/uL Final    ABSOLUTE EOSINOPHILS 05/03/2025 149  15 - 500 cells/uL Final    ABSOLUTE BASOPHILS 05/03/2025 62  0 - 200 cells/uL Final    NEUTROPHILS 05/03/2025 59.1  % Final    LYMPHOCYTES 05/03/2025 27.1  % Final    MONOCYTES 05/03/2025 10.4  % Final    EOSINOPHILS 05/03/2025 2.4  % Final    BASOPHILS 05/03/2025 1.0  % Final    GLUCOSE 05/03/2025 138 (H)  65 - 99 mg/dL Final    UREA NITROGEN (BUN) 05/03/2025 25  7 - 25 mg/dL Final    CREATININE 05/03/2025 0.96  0.70 - 1.35 mg/dL Final    EGFR 05/03/2025 89  > OR = 60 mL/min/1.73m2 Final    SODIUM 05/03/2025 141  135 - 146 mmol/L Final    POTASSIUM 05/03/2025 4.5  3.5 - 5.3 mmol/L Final    CHLORIDE 05/03/2025 103  98 - 110 mmol/L Final    CARBON DIOXIDE 05/03/2025 27  20 - 32 mmol/L Final    ELECTROLYTE BALANCE 05/03/2025 11  7 - 17 mmol/L (calc) Final    CALCIUM 05/03/2025 9.1  8.6 - 10.3 mg/dL Final    PROTEIN, TOTAL 05/03/2025 6.8  6.1 - 8.1 g/dL Final    ALBUMIN 05/03/2025 4.2  3.6 - 5.1 g/dL Final    BILIRUBIN, TOTAL 05/03/2025 1.3 (H)  0.2 - 1.2 mg/dL Final    ALKALINE PHOSPHATASE 05/03/2025 69  35 - 144 U/L Final    AST 05/03/2025 19  10 - 35 U/L Final    ALT 05/03/2025 24  9 - 46 U/L Final    TSH W/REFLEX TO FT4 05/03/2025 3.23  0.40 - 4.50 mIU/L Final    CHOLESTEROL, TOTAL 05/03/2025 104  <200 mg/dL Final    HDL CHOLESTEROL 05/03/2025 45  > OR = 40 mg/dL Final    TRIGLYCERIDES 05/03/2025 118  <150 mg/dL Final    LDL-CHOLESTEROL 05/03/2025 39  mg/dL (calc) Final    CHOL/HDLC RATIO 05/03/2025 2.3  <5.0 (calc) Final    NON HDL CHOLESTEROL 05/03/2025 59  <130 mg/dL (calc) Final    HEMOGLOBIN A1c 05/03/2025 6.8 (H)  <5.7 % Final    eAG  (mg/dL) 05/03/2025 148  mg/dL Final    eAG (mmol/L) 05/03/2025 8.2  mmol/L Final    CREATININE, RANDOM URINE 05/03/2025 182  20 - 320 mg/dL Final    ALBUMIN, URINE 05/03/2025 5.8  See Note: mg/dL Final    ALBUMIN/CREATININE RATIO, RANDOM U* 05/03/2025 32 (H)  <30 mg/g creat Final    PSA, TOTAL 05/03/2025 0.68  < OR = 4.00 ng/mL Final    TESTOSTERONE, TOTAL, MS 05/03/2025 161 (L)  250 - 1,100 ng/dL Final    TESTOSTERONE, FREE 05/03/2025 16.9 (L)  35.0 - 155.0 pg/mL Final    VITAMIN D,25-OH,TOTAL,IA 05/03/2025 30  30 - 100 ng/mL Final    CREATINE KINASE, TOTAL 05/03/2025 99  22 - 308 U/L Final   Office Visit on 02/25/2025   Component Date Value Ref Range Status    Ventricular Rate 02/25/2025 95  BPM Final    Atrial Rate 02/25/2025 95  BPM Final    SC Interval 02/25/2025 182  ms Final    QRS Duration 02/25/2025 86  ms Final    QT Interval 02/25/2025 366  ms Final    QTC Calculation(Bazett) 02/25/2025 459  ms Final    P Axis 02/25/2025 58  degrees Final    R Axis 02/25/2025 63  degrees Final    T Axis 02/25/2025 69  degrees Final    QRS Count 02/25/2025 15  beats Final    Q Onset 02/25/2025 219  ms Final    P Onset 02/25/2025 128  ms Final    P Offset 02/25/2025 184  ms Final    T Offset 02/25/2025 402  ms Final    QTC Fredericia 02/25/2025 426  ms Final       Radiology: Reviewed imaging in powerchart.  Imaging  No results found.    Cardiology, Vascular, and Other Imaging  No other imaging results found for the past 7 days        Charting was completed using voice recognition technology and may include unintended errors.            [1]   Past Medical History:  Diagnosis Date    Acute upper respiratory infection, unspecified 01/23/2020    Acute URI    Encounter for screening for malignant neoplasm of colon 08/01/2019    Screen for colon cancer    Encounter for screening for malignant neoplasm of rectum 08/01/2019    Screening for rectal cancer    Hypertension     Myocardial infarction (Multi)     Other specified  disorders of eustachian tube, bilateral 08/10/2020    Dysfunction of both eustachian tubes    Pain in right knee 11/08/2021    Chronic pain of both knees    Pain in unspecified knee 05/18/2017    Knee pain    Personal history of other diseases of the circulatory system     History of hypertension    Personal history of other diseases of the digestive system 07/20/2017    History of irritable bowel syndrome    Personal history of other diseases of the respiratory system 02/05/2018    History of acute bronchitis    Personal history of other diseases of the respiratory system 02/05/2018    History of reactive airway disease    Personal history of other endocrine, nutritional and metabolic disease 09/30/2021    History of morbid obesity    Personal history of other endocrine, nutritional and metabolic disease     History of high cholesterol    Sensorineural hearing loss, bilateral 04/15/2019    Sensorineural hearing loss (SNHL) of both ears    Type 2 diabetes mellitus without complications 12/02/2022    Diabetes mellitus   [2]   Past Surgical History:  Procedure Laterality Date    GALLBLADDER SURGERY  09/15/2014    Gallbladder Surgery    OTHER SURGICAL HISTORY  04/15/2019    Kidney surgery   [3] No Known Allergies  [4]   Current Outpatient Medications   Medication Sig Dispense Refill    aspirin 81 mg EC tablet Take 1 tablet (81 mg) by mouth once daily.      atorvastatin (Lipitor) 80 mg tablet TAKE 1 TABLET BY MOUTH AT BEDTIME 90 tablet 3    carvedilol (Coreg) 6.25 mg tablet TAKE 1 TABLET BY MOUTH TWO TIMES A  tablet 3    dapagliflozin propanediol (Farxiga) 10 mg tablet TAKE 1 TABLET BY MOUTH ONCE DAILY 90 tablet 3    ezetimibe (Zetia) 10 mg tablet TAKE 1 TABLET BY MOUTH IN THE EVENING 90 tablet 3    levothyroxine (Synthroid, Levoxyl) 25 mcg tablet TAKE 1 TABLET BY MOUTH ONCE DAILY 90 tablet 3    metFORMIN (Glucophage) 500 mg tablet TAKE 1 TABLET BY MOUTH ONCE DAILY 90 tablet 3    olmesartan-hydrochlorothiazide  (BENIcar HCT) 20-12.5 mg tablet TAKE 1 TABLET BY MOUTH EVERY MORNING 90 tablet 3     No current facility-administered medications for this visit.   [5]   Family History  Problem Relation Name Age of Onset    Cancer Mother Leticia Mague     Diabetes Mother Leticia Mague     Hypertension Mother Leticia ReeneMague     Other (heart problem) Mother Leticia Mague     Hearing loss Father Jona Mague     Hyperlipidemia Father Jona ReneeMague     Other (heart problem) Father Jona ReneeMague     Clotting disorder Sister Rosanna Spears         bleeding disorder    Cancer Sister Rosanna Spears     Diabetes Sister Rosanna Spears     Other (heart problem) Sister Rosanna Spears     Diabetes Brother     [6]   Social History  Socioeconomic History    Marital status:    Tobacco Use    Smoking status: Former     Current packs/day: 1.00     Average packs/day: 1 pack/day for 15.0 years (15.0 ttl pk-yrs)     Types: Cigarettes    Smokeless tobacco: Never   Substance and Sexual Activity    Alcohol use: Never    Drug use: Never    Sexual activity: Yes     Partners: Female     Birth control/protection: None

## 2025-05-19 PROCEDURE — RXMED WILLOW AMBULATORY MEDICATION CHARGE

## 2025-05-23 ENCOUNTER — PHARMACY VISIT (OUTPATIENT)
Dept: PHARMACY | Facility: CLINIC | Age: 65
End: 2025-05-23
Payer: COMMERCIAL

## 2025-05-27 DIAGNOSIS — Z00.00 HEALTH CARE MAINTENANCE: ICD-10-CM

## 2025-05-27 PROCEDURE — RXMED WILLOW AMBULATORY MEDICATION CHARGE

## 2025-05-27 RX ORDER — CELECOXIB 100 MG/1
100 CAPSULE ORAL DAILY
Qty: 30 CAPSULE | Refills: 1 | Status: SHIPPED | OUTPATIENT
Start: 2025-05-27

## 2025-06-02 ENCOUNTER — PHARMACY VISIT (OUTPATIENT)
Dept: PHARMACY | Facility: CLINIC | Age: 65
End: 2025-06-02
Payer: COMMERCIAL

## 2025-06-12 PROCEDURE — RXMED WILLOW AMBULATORY MEDICATION CHARGE

## 2025-06-13 ENCOUNTER — PHARMACY VISIT (OUTPATIENT)
Dept: PHARMACY | Facility: CLINIC | Age: 65
End: 2025-06-13
Payer: COMMERCIAL

## 2025-06-18 PROCEDURE — RXMED WILLOW AMBULATORY MEDICATION CHARGE

## 2025-06-19 ENCOUNTER — PHARMACY VISIT (OUTPATIENT)
Dept: PHARMACY | Facility: CLINIC | Age: 65
End: 2025-06-19
Payer: COMMERCIAL

## 2025-07-14 ENCOUNTER — APPOINTMENT (OUTPATIENT)
Dept: PRIMARY CARE | Facility: CLINIC | Age: 65
End: 2025-07-14
Payer: COMMERCIAL

## 2025-07-14 DIAGNOSIS — E08.00 DIABETES MELLITUS DUE TO UNDERLYING CONDITION WITH HYPEROSMOLARITY WITHOUT COMA, WITHOUT LONG-TERM CURRENT USE OF INSULIN: ICD-10-CM

## 2025-07-14 DIAGNOSIS — Z00.00 HEALTH CARE MAINTENANCE: ICD-10-CM

## 2025-07-14 DIAGNOSIS — Z11.59 NEED FOR HEPATITIS C SCREENING TEST: ICD-10-CM

## 2025-07-14 DIAGNOSIS — E03.9 ACQUIRED HYPOTHYROIDISM: ICD-10-CM

## 2025-07-14 DIAGNOSIS — R79.89 LOW TESTOSTERONE IN MALE: ICD-10-CM

## 2025-07-14 DIAGNOSIS — Z11.4 SCREENING FOR HUMAN IMMUNODEFICIENCY VIRUS: ICD-10-CM

## 2025-07-14 DIAGNOSIS — Z95.5 STATUS POST INSERTION OF DRUG-ELUTING STENT INTO RIGHT CORONARY ARTERY FOR CORONARY ARTERY DISEASE: ICD-10-CM

## 2025-07-14 PROBLEM — Z00.01 ANNUAL VISIT FOR GENERAL ADULT MEDICAL EXAMINATION WITH ABNORMAL FINDINGS: Status: RESOLVED | Noted: 2025-05-15 | Resolved: 2025-07-14

## 2025-07-14 PROBLEM — K63.5 COLON POLYP: Status: RESOLVED | Noted: 2024-02-15 | Resolved: 2025-07-14

## 2025-07-14 PROCEDURE — 1036F TOBACCO NON-USER: CPT | Performed by: INTERNAL MEDICINE

## 2025-07-14 PROCEDURE — 99213 OFFICE O/P EST LOW 20 MIN: CPT | Performed by: INTERNAL MEDICINE

## 2025-07-14 NOTE — PROGRESS NOTES
Subjective   Patient ID: Villa Bowser is a 65 y.o. male who presents for Follow-up.    Assessment/Plan     Problem List Items Addressed This Visit       Diabetes mellitus (Multi)    Diabetes with hypertension hyperlipidemia proteinuria hypothyroidism advise continue aspirin 81 mg a day Lipitor 80 mg a day Coreg 6.25 twice a day Farxiga 10 mg a day Zetia 10 mg a day Synthroid 25 mcg a day Glucophage 500 mg a day Benicar 20 mg a day Mounjaro 7.5 once a weekly check CMP CPK lipid hemoglobin A1c CPK twice a year follow-up 3 times a year refer to the ophthalmologist dietitian         Relevant Orders    Referral to Ophthalmology    Lipase    Comprehensive metabolic panel    Acquired hypothyroidism    Status post insertion of drug-eluting stent into right coronary artery for coronary artery disease    Not in a pain continue aspirin controlled BMI blood pressure LDL cholesterol hemoglobin A1c         Screening for human immunodeficiency virus    Relevant Orders    HIV 1/2 Antigen/Antibody Screen with Reflex to Confirmation    Low testosterone in male    Follow-up with urology         Need for hepatitis C screening test    Relevant Orders    Hepatitis C antibody     Anemia blood glucose 138 bilirubin 1.3 A1c 6.8 LDL 39 prostate normal testosterone pending vitamin D normal advised follow-up advise a yearly adult physical checkup in office plus get Pneumovax 20 Shingrix COVID-vaccine from pharmacy   HPI 70-year-old patient have metabolic syndrome diabetes hypertension hyperlipidemia hypothyroidism obesity atherosclerotic heart disease status post stent placement    With help of diet exercise Mounjaro successfully lost 30 pounds weight feeling much better    Negative for polyuria polydipsia polyphagia or jaundice    Negative for headache chest pain or kidney stone    Negative personal family history of thyroid parathyroid cancer pancreatic cancer pancreatitis    No hypohyper hypoxia hypoglycemia hypotension or  fall  Medical History[1]  Surgical History[2]  Allergies[3]  Current Medications[4]  Family History[5]  Social History[6]  Immunization History   Administered Date(s) Administered    Flu vaccine (IIV4), preservative free *Check age/dose* 10/24/2021    Influenza, Unspecified 09/15/2009, 10/20/2016, 09/24/2019, 09/04/2020, 10/18/2022    Influenza, seasonal, injectable 10/30/2012, 10/20/2016, 10/18/2022    Pfizer COVID-19 vaccine, 12 years and older, (30mcg/0.3mL) (Comirnaty) 10/15/2023    Pfizer Gray Cap SARS-CoV-2 05/17/2022    Pfizer Purple Cap SARS-CoV-2 03/17/2021, 04/07/2021, 12/21/2021, 05/17/2022    Pneumococcal polysaccharide vaccine, 23-valent, age 2 years and older (PNEUMOVAX 23) 10/30/2012    Tdap vaccine, age 7 year and older (BOOSTRIX, ADACEL) 07/30/2018    Zoster vaccine, recombinant, adult (SHINGRIX) 09/04/2020    Zoster, Unspecified 09/04/2020       Review of Systems  Review of systems is otherwise negative unless stated above or in history of present illness.    Objective   Visit Vitals  Smoking Status Former     Physical Exam moderately obese not in a distress no visible jaundice or skin rash.Doxy  This visit was completed via video audio relation to  covid 19 pandemic all issues as below that discuss and address but no physical exam was performed if it was felt that patient should be evaluated in clinic and they have been advised to follow . Patient verbally consented to visit and spent  more than 50% discuss about patient's complaint of problem and plan      Orders Only on 04/17/2025   Component Date Value Ref Range Status    WHITE BLOOD CELL COUNT 05/03/2025 6.2  3.8 - 10.8 Thousand/uL Final    RED BLOOD CELL COUNT 05/03/2025 5.37  4.20 - 5.80 Million/uL Final    HEMOGLOBIN 05/03/2025 14.6  13.2 - 17.1 g/dL Final    HEMATOCRIT 05/03/2025 45.9  38.5 - 50.0 % Final    MCV 05/03/2025 85.5  80.0 - 100.0 fL Final    MCH 05/03/2025 27.2  27.0 - 33.0 pg Final    MCHC 05/03/2025 31.8 (L)  32.0 - 36.0 g/dL  Final    RDW 05/03/2025 15.1 (H)  11.0 - 15.0 % Final    PLATELET COUNT 05/03/2025 197  140 - 400 Thousand/uL Final    MPV 05/03/2025 11.6  7.5 - 12.5 fL Final    ABSOLUTE NEUTROPHILS 05/03/2025 3,664  1,500 - 7,800 cells/uL Final    ABSOLUTE LYMPHOCYTES 05/03/2025 1,680  850 - 3,900 cells/uL Final    ABSOLUTE MONOCYTES 05/03/2025 645  200 - 950 cells/uL Final    ABSOLUTE EOSINOPHILS 05/03/2025 149  15 - 500 cells/uL Final    ABSOLUTE BASOPHILS 05/03/2025 62  0 - 200 cells/uL Final    NEUTROPHILS 05/03/2025 59.1  % Final    LYMPHOCYTES 05/03/2025 27.1  % Final    MONOCYTES 05/03/2025 10.4  % Final    EOSINOPHILS 05/03/2025 2.4  % Final    BASOPHILS 05/03/2025 1.0  % Final    GLUCOSE 05/03/2025 138 (H)  65 - 99 mg/dL Final    UREA NITROGEN (BUN) 05/03/2025 25  7 - 25 mg/dL Final    CREATININE 05/03/2025 0.96  0.70 - 1.35 mg/dL Final    EGFR 05/03/2025 89  > OR = 60 mL/min/1.73m2 Final    SODIUM 05/03/2025 141  135 - 146 mmol/L Final    POTASSIUM 05/03/2025 4.5  3.5 - 5.3 mmol/L Final    CHLORIDE 05/03/2025 103  98 - 110 mmol/L Final    CARBON DIOXIDE 05/03/2025 27  20 - 32 mmol/L Final    ELECTROLYTE BALANCE 05/03/2025 11  7 - 17 mmol/L (calc) Final    CALCIUM 05/03/2025 9.1  8.6 - 10.3 mg/dL Final    PROTEIN, TOTAL 05/03/2025 6.8  6.1 - 8.1 g/dL Final    ALBUMIN 05/03/2025 4.2  3.6 - 5.1 g/dL Final    BILIRUBIN, TOTAL 05/03/2025 1.3 (H)  0.2 - 1.2 mg/dL Final    ALKALINE PHOSPHATASE 05/03/2025 69  35 - 144 U/L Final    AST 05/03/2025 19  10 - 35 U/L Final    ALT 05/03/2025 24  9 - 46 U/L Final    TSH W/REFLEX TO FT4 05/03/2025 3.23  0.40 - 4.50 mIU/L Final    CHOLESTEROL, TOTAL 05/03/2025 104  <200 mg/dL Final    HDL CHOLESTEROL 05/03/2025 45  > OR = 40 mg/dL Final    TRIGLYCERIDES 05/03/2025 118  <150 mg/dL Final    LDL-CHOLESTEROL 05/03/2025 39  mg/dL (calc) Final    CHOL/HDLC RATIO 05/03/2025 2.3  <5.0 (calc) Final    NON HDL CHOLESTEROL 05/03/2025 59  <130 mg/dL (calc) Final    HEMOGLOBIN A1c 05/03/2025 6.8 (H)   <5.7 % Final    eAG (mg/dL) 05/03/2025 148  mg/dL Final    eAG (mmol/L) 05/03/2025 8.2  mmol/L Final    CREATININE, RANDOM URINE 05/03/2025 182  20 - 320 mg/dL Final    ALBUMIN, URINE 05/03/2025 5.8  See Note: mg/dL Final    ALBUMIN/CREATININE RATIO, RANDOM U* 05/03/2025 32 (H)  <30 mg/g creat Final    PSA, TOTAL 05/03/2025 0.68  < OR = 4.00 ng/mL Final    TESTOSTERONE, TOTAL, MS 05/03/2025 161 (L)  250 - 1,100 ng/dL Final    TESTOSTERONE, FREE 05/03/2025 16.9 (L)  35.0 - 155.0 pg/mL Final    VITAMIN D,25-OH,TOTAL,IA 05/03/2025 30  30 - 100 ng/mL Final    CREATINE KINASE, TOTAL 05/03/2025 99  22 - 308 U/L Final       Radiology: Reviewed imaging in powerchart.  Imaging  No results found.    Cardiology, Vascular, and Other Imaging  No other imaging results found for the past 7 days        Charting was completed using voice recognition technology and may include unintended errors.            [1]   Past Medical History:  Diagnosis Date    Acute upper respiratory infection, unspecified 01/23/2020    Acute URI    Encounter for screening for malignant neoplasm of colon 08/01/2019    Screen for colon cancer    Encounter for screening for malignant neoplasm of rectum 08/01/2019    Screening for rectal cancer    Hypertension     Myocardial infarction (Multi)     Other specified disorders of eustachian tube, bilateral 08/10/2020    Dysfunction of both eustachian tubes    Pain in right knee 11/08/2021    Chronic pain of both knees    Pain in unspecified knee 05/18/2017    Knee pain    Personal history of other diseases of the circulatory system     History of hypertension    Personal history of other diseases of the digestive system 07/20/2017    History of irritable bowel syndrome    Personal history of other diseases of the respiratory system 02/05/2018    History of acute bronchitis    Personal history of other diseases of the respiratory system 02/05/2018    History of reactive airway disease    Personal history of other  endocrine, nutritional and metabolic disease 09/30/2021    History of morbid obesity    Personal history of other endocrine, nutritional and metabolic disease     History of high cholesterol    Sensorineural hearing loss, bilateral 04/15/2019    Sensorineural hearing loss (SNHL) of both ears    Type 2 diabetes mellitus without complications 12/02/2022    Diabetes mellitus   [2]   Past Surgical History:  Procedure Laterality Date    GALLBLADDER SURGERY  09/15/2014    Gallbladder Surgery    OTHER SURGICAL HISTORY  04/15/2019    Kidney surgery   [3] No Known Allergies  [4]   Current Outpatient Medications   Medication Sig Dispense Refill    aspirin 81 mg EC tablet Take 1 tablet (81 mg) by mouth once daily.      atorvastatin (Lipitor) 80 mg tablet TAKE 1 TABLET BY MOUTH AT BEDTIME 90 tablet 3    carvedilol (Coreg) 6.25 mg tablet TAKE 1 TABLET BY MOUTH TWO TIMES A  tablet 3    celecoxib (CeleBREX) 100 mg capsule Take 1 capsule (100 mg) by mouth once daily. 30 capsule 1    dapagliflozin propanediol (Farxiga) 10 mg tablet TAKE 1 TABLET BY MOUTH ONCE DAILY 90 tablet 3    ezetimibe (Zetia) 10 mg tablet TAKE 1 TABLET BY MOUTH IN THE EVENING 90 tablet 3    levothyroxine (Synthroid, Levoxyl) 25 mcg tablet TAKE 1 TABLET BY MOUTH ONCE DAILY 90 tablet 3    metFORMIN (Glucophage) 500 mg tablet TAKE 1 TABLET BY MOUTH ONCE DAILY 90 tablet 3    olmesartan-hydrochlorothiazide (BENIcar HCT) 20-12.5 mg tablet TAKE 1 TABLET BY MOUTH EVERY MORNING 90 tablet 3    tirzepatide (Mounjaro) 10 mg/0.5 mL pen injector Inject 10 mg under the skin 1 (one) time per week. 2 mL 0    tirzepatide (Mounjaro) 5 mg/0.5 mL pen injector Inject 5 mg under the skin 1 (one) time per week. 2 mL 0    tirzepatide (Mounjaro) 7.5 mg/0.5 mL pen injector Inject 7.5 mg under the skin 1 (one) time per week. 2 mL 0     No current facility-administered medications for this visit.   [5]   Family History  Problem Relation Name Age of Onset    Cancer Mother Leticia  Mague     Diabetes Mother Leticia Mague     Hypertension Mother Leticia ReneeMague     Other (heart problem) Mother Leticia Mague     Hearing loss Father Jona Mague     Hyperlipidemia Father Jona ReneeMague     Other (heart problem) Father Jona ReneeMague     Clotting disorder Sister Rosanna Spears         bleeding disorder    Cancer Sister Rosanna Spears     Diabetes Sister Rosanna Spears     Other (heart problem) Sister Rosanna Spears     Diabetes Brother     [6]   Social History  Socioeconomic History    Marital status:    Tobacco Use    Smoking status: Former     Current packs/day: 1.00     Average packs/day: 1 pack/day for 15.0 years (15.0 ttl pk-yrs)     Types: Cigarettes    Smokeless tobacco: Never   Substance and Sexual Activity    Alcohol use: Never    Drug use: Never    Sexual activity: Yes     Partners: Female     Birth control/protection: None

## 2025-07-14 NOTE — ASSESSMENT & PLAN NOTE
Diabetes with hypertension hyperlipidemia proteinuria hypothyroidism advise continue aspirin 81 mg a day Lipitor 80 mg a day Coreg 6.25 twice a day Farxiga 10 mg a day Zetia 10 mg a day Synthroid 25 mcg a day Glucophage 500 mg a day Benicar 20 mg a day Mounjaro 7.5 once a weekly check CMP CPK lipid hemoglobin A1c CPK twice a year follow-up 3 times a year refer to the ophthalmologist dietitian

## 2025-07-15 ENCOUNTER — PHARMACY VISIT (OUTPATIENT)
Dept: PHARMACY | Facility: CLINIC | Age: 65
End: 2025-07-15
Payer: COMMERCIAL

## 2025-07-15 PROCEDURE — RXMED WILLOW AMBULATORY MEDICATION CHARGE

## 2025-07-15 RX ORDER — CELECOXIB 100 MG/1
100 CAPSULE ORAL DAILY
Qty: 90 CAPSULE | Refills: 1 | Status: SHIPPED | OUTPATIENT
Start: 2025-07-15

## 2025-07-16 ENCOUNTER — PHARMACY VISIT (OUTPATIENT)
Dept: PHARMACY | Facility: CLINIC | Age: 65
End: 2025-07-16
Payer: COMMERCIAL

## 2025-08-04 DIAGNOSIS — E11.9 TYPE 2 DIABETES MELLITUS WITHOUT COMPLICATION, WITHOUT LONG-TERM CURRENT USE OF INSULIN: ICD-10-CM

## 2025-08-04 DIAGNOSIS — E66.01 MORBID OBESITY WITH BMI OF 40.0-44.9, ADULT (MULTI): ICD-10-CM

## 2025-08-04 PROCEDURE — RXMED WILLOW AMBULATORY MEDICATION CHARGE

## 2025-08-04 RX ORDER — TIRZEPATIDE 15 MG/.5ML
15 INJECTION, SOLUTION SUBCUTANEOUS WEEKLY
Qty: 2 ML | Refills: 3 | Status: SHIPPED | OUTPATIENT
Start: 2025-08-04

## 2025-08-04 RX ORDER — TIRZEPATIDE 12.5 MG/.5ML
12.5 INJECTION, SOLUTION SUBCUTANEOUS WEEKLY
Qty: 2 ML | Refills: 0 | Status: SHIPPED | OUTPATIENT
Start: 2025-08-04

## 2025-08-08 ENCOUNTER — PHARMACY VISIT (OUTPATIENT)
Dept: PHARMACY | Facility: CLINIC | Age: 65
End: 2025-08-08
Payer: COMMERCIAL